# Patient Record
Sex: FEMALE | Race: WHITE | NOT HISPANIC OR LATINO
[De-identification: names, ages, dates, MRNs, and addresses within clinical notes are randomized per-mention and may not be internally consistent; named-entity substitution may affect disease eponyms.]

---

## 2018-01-10 ENCOUNTER — APPOINTMENT (OUTPATIENT)
Dept: CARDIOLOGY | Facility: CLINIC | Age: 83
End: 2018-01-10
Payer: MEDICARE

## 2018-01-10 PROCEDURE — 99214 OFFICE O/P EST MOD 30 MIN: CPT

## 2018-01-10 PROCEDURE — 93000 ELECTROCARDIOGRAM COMPLETE: CPT

## 2018-01-16 ENCOUNTER — APPOINTMENT (OUTPATIENT)
Dept: PULMONOLOGY | Facility: CLINIC | Age: 83
End: 2018-01-16
Payer: MEDICARE

## 2018-01-16 VITALS — BODY MASS INDEX: 30.04 KG/M2 | HEIGHT: 59 IN | WEIGHT: 149 LBS

## 2018-01-16 VITALS — OXYGEN SATURATION: 99 %

## 2018-01-16 DIAGNOSIS — J18.9 PNEUMONIA, UNSPECIFIED ORGANISM: ICD-10-CM

## 2018-01-16 DIAGNOSIS — Z86.79 PERSONAL HISTORY OF OTHER DISEASES OF THE CIRCULATORY SYSTEM: ICD-10-CM

## 2018-01-16 PROCEDURE — 94664 DEMO&/EVAL PT USE INHALER: CPT | Mod: 59

## 2018-01-16 PROCEDURE — 85018 HEMOGLOBIN: CPT | Mod: QW

## 2018-01-16 PROCEDURE — 99205 OFFICE O/P NEW HI 60 MIN: CPT | Mod: 25

## 2018-01-16 PROCEDURE — 94727 GAS DIL/WSHOT DETER LNG VOL: CPT

## 2018-01-16 PROCEDURE — 94060 EVALUATION OF WHEEZING: CPT

## 2018-01-16 PROCEDURE — 94729 DIFFUSING CAPACITY: CPT

## 2018-01-21 DIAGNOSIS — Z87.898 PERSONAL HISTORY OF OTHER SPECIFIED CONDITIONS: ICD-10-CM

## 2018-01-21 DIAGNOSIS — Z92.89 PERSONAL HISTORY OF OTHER MEDICAL TREATMENT: ICD-10-CM

## 2018-02-09 PROBLEM — Z87.898 HISTORY OF EDEMA: Status: RESOLVED | Noted: 2018-02-09 | Resolved: 2018-02-09

## 2018-02-09 PROBLEM — Z92.89 HISTORY OF RECENT HOSPITALIZATION: Status: ACTIVE | Noted: 2018-02-09

## 2018-02-12 ENCOUNTER — FORM ENCOUNTER (OUTPATIENT)
Age: 83
End: 2018-02-12

## 2018-02-13 ENCOUNTER — MEDICATION RENEWAL (OUTPATIENT)
Age: 83
End: 2018-02-13

## 2018-02-13 ENCOUNTER — OUTPATIENT (OUTPATIENT)
Dept: OUTPATIENT SERVICES | Facility: HOSPITAL | Age: 83
LOS: 1 days | End: 2018-02-13
Payer: MEDICARE

## 2018-02-13 DIAGNOSIS — I27.20 PULMONARY HYPERTENSION, UNSPECIFIED: ICD-10-CM

## 2018-02-13 PROCEDURE — 78582 LUNG VENTILAT&PERFUS IMAGING: CPT | Mod: 26

## 2018-02-13 PROCEDURE — 78582 LUNG VENTILAT&PERFUS IMAGING: CPT

## 2018-02-13 PROCEDURE — A9567: CPT

## 2018-02-13 PROCEDURE — 71045 X-RAY EXAM CHEST 1 VIEW: CPT

## 2018-02-13 PROCEDURE — 71045 X-RAY EXAM CHEST 1 VIEW: CPT | Mod: 26

## 2018-02-13 PROCEDURE — A9540: CPT

## 2018-02-20 ENCOUNTER — APPOINTMENT (OUTPATIENT)
Dept: CARDIOLOGY | Facility: CLINIC | Age: 83
End: 2018-02-20
Payer: MEDICARE

## 2018-02-20 ENCOUNTER — RX RENEWAL (OUTPATIENT)
Age: 83
End: 2018-02-20

## 2018-02-20 VITALS
OXYGEN SATURATION: 96 % | HEART RATE: 81 BPM | WEIGHT: 155 LBS | BODY MASS INDEX: 33.44 KG/M2 | SYSTOLIC BLOOD PRESSURE: 104 MMHG | RESPIRATION RATE: 20 BRPM | HEIGHT: 57 IN | DIASTOLIC BLOOD PRESSURE: 60 MMHG

## 2018-02-20 PROCEDURE — 93280 PM DEVICE PROGR EVAL DUAL: CPT

## 2018-02-20 PROCEDURE — 93306 TTE W/DOPPLER COMPLETE: CPT

## 2018-02-20 PROCEDURE — 93000 ELECTROCARDIOGRAM COMPLETE: CPT | Mod: XE

## 2018-02-20 PROCEDURE — 99215 OFFICE O/P EST HI 40 MIN: CPT

## 2018-02-20 RX ORDER — OXYCODONE AND ACETAMINOPHEN 5; 325 MG/1; MG/1
5-325 TABLET ORAL
Refills: 0 | Status: DISCONTINUED | COMMUNITY
End: 2018-02-20

## 2018-02-20 RX ORDER — METOPROLOL SUCCINATE 25 MG/1
25 TABLET, EXTENDED RELEASE ORAL
Refills: 0 | Status: DISCONTINUED | COMMUNITY
End: 2018-02-20

## 2018-03-19 ENCOUNTER — APPOINTMENT (OUTPATIENT)
Dept: CARDIOLOGY | Facility: CLINIC | Age: 83
End: 2018-03-19
Payer: MEDICARE

## 2018-03-19 ENCOUNTER — FORM ENCOUNTER (OUTPATIENT)
Age: 83
End: 2018-03-19

## 2018-03-19 VITALS
HEIGHT: 59 IN | SYSTOLIC BLOOD PRESSURE: 115 MMHG | HEART RATE: 80 BPM | WEIGHT: 153 LBS | RESPIRATION RATE: 14 BRPM | BODY MASS INDEX: 30.84 KG/M2 | DIASTOLIC BLOOD PRESSURE: 70 MMHG

## 2018-03-19 PROCEDURE — 93000 ELECTROCARDIOGRAM COMPLETE: CPT

## 2018-03-19 PROCEDURE — 99214 OFFICE O/P EST MOD 30 MIN: CPT

## 2018-03-20 ENCOUNTER — APPOINTMENT (OUTPATIENT)
Dept: CT IMAGING | Facility: CLINIC | Age: 83
End: 2018-03-20
Payer: MEDICARE

## 2018-03-20 ENCOUNTER — OUTPATIENT (OUTPATIENT)
Dept: OUTPATIENT SERVICES | Facility: HOSPITAL | Age: 83
LOS: 1 days | End: 2018-03-20
Payer: MEDICARE

## 2018-03-20 DIAGNOSIS — G47.33 OBSTRUCTIVE SLEEP APNEA (ADULT) (PEDIATRIC): ICD-10-CM

## 2018-03-20 PROCEDURE — 71250 CT THORAX DX C-: CPT

## 2018-03-20 PROCEDURE — 71250 CT THORAX DX C-: CPT | Mod: 26

## 2018-04-03 ENCOUNTER — APPOINTMENT (OUTPATIENT)
Dept: CARDIOLOGY | Facility: CLINIC | Age: 83
End: 2018-04-03
Payer: MEDICARE

## 2018-04-03 PROCEDURE — 93280 PM DEVICE PROGR EVAL DUAL: CPT

## 2018-04-17 ENCOUNTER — APPOINTMENT (OUTPATIENT)
Dept: PULMONOLOGY | Facility: CLINIC | Age: 83
End: 2018-04-17
Payer: MEDICARE

## 2018-04-17 VITALS
SYSTOLIC BLOOD PRESSURE: 106 MMHG | OXYGEN SATURATION: 96 % | DIASTOLIC BLOOD PRESSURE: 46 MMHG | HEART RATE: 71 BPM | WEIGHT: 153 LBS | BODY MASS INDEX: 30.9 KG/M2

## 2018-04-17 PROCEDURE — 99215 OFFICE O/P EST HI 40 MIN: CPT

## 2018-04-26 ENCOUNTER — MEDICATION RENEWAL (OUTPATIENT)
Age: 83
End: 2018-04-26

## 2018-05-02 ENCOUNTER — MEDICATION RENEWAL (OUTPATIENT)
Age: 83
End: 2018-05-02

## 2018-05-02 ENCOUNTER — RX RENEWAL (OUTPATIENT)
Age: 83
End: 2018-05-02

## 2018-05-21 ENCOUNTER — APPOINTMENT (OUTPATIENT)
Dept: CARDIOLOGY | Facility: CLINIC | Age: 83
End: 2018-05-21
Payer: MEDICARE

## 2018-05-21 PROCEDURE — 93280 PM DEVICE PROGR EVAL DUAL: CPT

## 2018-05-23 ENCOUNTER — MEDICATION RENEWAL (OUTPATIENT)
Age: 83
End: 2018-05-23

## 2018-05-24 ENCOUNTER — APPOINTMENT (OUTPATIENT)
Dept: CARDIOLOGY | Facility: CLINIC | Age: 83
End: 2018-05-24
Payer: MEDICARE

## 2018-05-24 VITALS
SYSTOLIC BLOOD PRESSURE: 95 MMHG | HEIGHT: 59 IN | DIASTOLIC BLOOD PRESSURE: 58 MMHG | HEART RATE: 72 BPM | BODY MASS INDEX: 30.64 KG/M2 | WEIGHT: 152 LBS | RESPIRATION RATE: 14 BRPM

## 2018-05-24 PROCEDURE — 93000 ELECTROCARDIOGRAM COMPLETE: CPT

## 2018-05-24 PROCEDURE — 99215 OFFICE O/P EST HI 40 MIN: CPT

## 2018-05-24 RX ORDER — MULTIVIT-MIN/FOLIC/VIT K/LYCOP 400-300MCG
50 MCG TABLET ORAL
Qty: 90 | Refills: 0 | Status: ACTIVE | COMMUNITY

## 2018-05-24 RX ORDER — FERROUS SULFATE 325(65) MG
TABLET ORAL DAILY
Refills: 0 | Status: ACTIVE | COMMUNITY

## 2018-05-24 RX ORDER — WARFARIN 3 MG/1
3 TABLET ORAL DAILY
Qty: 90 | Refills: 0 | Status: ACTIVE | COMMUNITY
Start: 2017-09-18

## 2018-06-01 ENCOUNTER — CHART COPY (OUTPATIENT)
Age: 83
End: 2018-06-01

## 2018-07-30 ENCOUNTER — APPOINTMENT (OUTPATIENT)
Dept: CARDIOLOGY | Facility: CLINIC | Age: 83
End: 2018-07-30
Payer: MEDICARE

## 2018-07-30 VITALS
DIASTOLIC BLOOD PRESSURE: 58 MMHG | HEIGHT: 59 IN | BODY MASS INDEX: 30.24 KG/M2 | WEIGHT: 150 LBS | RESPIRATION RATE: 18 BRPM | HEART RATE: 70 BPM | SYSTOLIC BLOOD PRESSURE: 100 MMHG

## 2018-07-30 VITALS — WEIGHT: 150 LBS | BODY MASS INDEX: 30.24 KG/M2 | HEIGHT: 59 IN

## 2018-07-30 PROCEDURE — 99214 OFFICE O/P EST MOD 30 MIN: CPT

## 2018-07-30 PROCEDURE — 93000 ELECTROCARDIOGRAM COMPLETE: CPT

## 2018-07-30 RX ORDER — PREGABALIN 100 MG/1
100 CAPSULE ORAL 3 TIMES DAILY
Refills: 0 | Status: DISCONTINUED | COMMUNITY
End: 2018-07-30

## 2018-08-06 RX ORDER — OMEGA-3/DHA/EPA/FISH OIL 300-1000MG
400 CAPSULE ORAL
Refills: 0 | Status: DISCONTINUED | COMMUNITY
End: 2018-08-06

## 2018-08-21 ENCOUNTER — APPOINTMENT (OUTPATIENT)
Dept: CARDIOLOGY | Facility: CLINIC | Age: 83
End: 2018-08-21
Payer: MEDICARE

## 2018-08-21 PROCEDURE — 93280 PM DEVICE PROGR EVAL DUAL: CPT

## 2018-08-28 ENCOUNTER — MEDICATION RENEWAL (OUTPATIENT)
Age: 83
End: 2018-08-28

## 2018-08-31 ENCOUNTER — RX RENEWAL (OUTPATIENT)
Age: 83
End: 2018-08-31

## 2018-09-07 ENCOUNTER — RX RENEWAL (OUTPATIENT)
Age: 83
End: 2018-09-07

## 2018-09-18 ENCOUNTER — APPOINTMENT (OUTPATIENT)
Dept: CARDIOLOGY | Facility: CLINIC | Age: 83
End: 2018-09-18

## 2018-09-26 ENCOUNTER — APPOINTMENT (OUTPATIENT)
Dept: CARDIOLOGY | Facility: CLINIC | Age: 83
End: 2018-09-26
Payer: MEDICARE

## 2018-09-26 PROCEDURE — 93280 PM DEVICE PROGR EVAL DUAL: CPT

## 2018-10-08 RX ORDER — PREGABALIN 100 MG/1
100 CAPSULE ORAL
Refills: 0 | Status: ACTIVE | COMMUNITY

## 2018-10-15 ENCOUNTER — APPOINTMENT (OUTPATIENT)
Dept: PULMONOLOGY | Facility: CLINIC | Age: 83
End: 2018-10-15
Payer: MEDICARE

## 2018-10-15 VITALS
SYSTOLIC BLOOD PRESSURE: 112 MMHG | OXYGEN SATURATION: 97 % | WEIGHT: 150 LBS | DIASTOLIC BLOOD PRESSURE: 48 MMHG | BODY MASS INDEX: 30.3 KG/M2 | HEART RATE: 77 BPM

## 2018-10-15 DIAGNOSIS — Z87.891 PERSONAL HISTORY OF NICOTINE DEPENDENCE: ICD-10-CM

## 2018-10-15 PROCEDURE — 99214 OFFICE O/P EST MOD 30 MIN: CPT

## 2018-10-15 RX ORDER — IPRATROPIUM BROMIDE AND ALBUTEROL SULFATE 2.5; .5 MG/3ML; MG/3ML
0.5-2.5 (3) SOLUTION RESPIRATORY (INHALATION)
Refills: 3 | Status: DISCONTINUED | COMMUNITY
End: 2018-10-15

## 2018-10-15 RX ORDER — FLUTICASONE PROPIONATE AND SALMETEROL 50; 250 UG/1; UG/1
250-50 POWDER RESPIRATORY (INHALATION) TWICE DAILY
Refills: 0 | Status: DISCONTINUED | COMMUNITY
End: 2018-10-15

## 2018-10-15 RX ORDER — OXYCODONE AND ACETAMINOPHEN 5; 325 MG/1; MG/1
5-325 TABLET ORAL
Refills: 0 | Status: DISCONTINUED | COMMUNITY
End: 2018-10-15

## 2018-10-18 ENCOUNTER — APPOINTMENT (OUTPATIENT)
Dept: CARDIOLOGY | Facility: CLINIC | Age: 83
End: 2018-10-18
Payer: MEDICARE

## 2018-10-18 VITALS
HEIGHT: 59 IN | SYSTOLIC BLOOD PRESSURE: 115 MMHG | RESPIRATION RATE: 16 BRPM | HEART RATE: 70 BPM | WEIGHT: 150 LBS | DIASTOLIC BLOOD PRESSURE: 58 MMHG | BODY MASS INDEX: 30.24 KG/M2

## 2018-10-18 PROCEDURE — 93306 TTE W/DOPPLER COMPLETE: CPT

## 2018-10-18 PROCEDURE — 93000 ELECTROCARDIOGRAM COMPLETE: CPT

## 2018-10-18 PROCEDURE — 99214 OFFICE O/P EST MOD 30 MIN: CPT

## 2018-11-01 ENCOUNTER — RX RENEWAL (OUTPATIENT)
Age: 83
End: 2018-11-01

## 2018-11-01 ENCOUNTER — MEDICATION RENEWAL (OUTPATIENT)
Age: 83
End: 2018-11-01

## 2018-11-12 ENCOUNTER — RX RENEWAL (OUTPATIENT)
Age: 83
End: 2018-11-12

## 2019-01-01 ENCOUNTER — RX RENEWAL (OUTPATIENT)
Age: 84
End: 2019-01-01

## 2019-01-01 ENCOUNTER — APPOINTMENT (OUTPATIENT)
Dept: CARDIOLOGY | Facility: CLINIC | Age: 84
End: 2019-01-01
Payer: MEDICARE

## 2019-01-01 PROCEDURE — 93288 INTERROG EVL PM/LDLS PM IP: CPT

## 2019-01-01 PROCEDURE — 93306 TTE W/DOPPLER COMPLETE: CPT

## 2019-01-01 RX ORDER — PERFLUTREN 6.52 MG/ML
6.52 INJECTION, SUSPENSION INTRAVENOUS
Qty: 4 | Refills: 0 | Status: COMPLETED | OUTPATIENT
Start: 2019-01-01

## 2019-01-01 RX ADMIN — PERFLUTREN MG/ML: 6.52 INJECTION, SUSPENSION INTRAVENOUS at 00:00

## 2019-01-09 ENCOUNTER — APPOINTMENT (OUTPATIENT)
Dept: CARDIOLOGY | Facility: CLINIC | Age: 84
End: 2019-01-09
Payer: MEDICARE

## 2019-01-09 VITALS
DIASTOLIC BLOOD PRESSURE: 62 MMHG | BODY MASS INDEX: 30.44 KG/M2 | SYSTOLIC BLOOD PRESSURE: 120 MMHG | WEIGHT: 151 LBS | HEART RATE: 70 BPM | HEIGHT: 59 IN | RESPIRATION RATE: 16 BRPM

## 2019-01-09 PROCEDURE — 93000 ELECTROCARDIOGRAM COMPLETE: CPT

## 2019-01-09 PROCEDURE — 99214 OFFICE O/P EST MOD 30 MIN: CPT

## 2019-01-09 RX ORDER — POTASSIUM CHLORIDE 1500 MG/1
20 TABLET, FILM COATED, EXTENDED RELEASE ORAL
Qty: 90 | Refills: 2 | Status: DISCONTINUED | COMMUNITY
Start: 2018-02-20 | End: 2019-01-09

## 2019-01-09 NOTE — PHYSICAL EXAM
[Normal Conjunctiva] : the conjunctiva exhibited no abnormalities [Eyelids - No Xanthelasma] : the eyelids demonstrated no xanthelasmas [Normal Oral Mucosa] : normal oral mucosa [No Oral Pallor] : no oral pallor [No Oral Cyanosis] : no oral cyanosis [Normal Jugular Venous A Waves Present] : normal jugular venous A waves present [Normal Jugular Venous V Waves Present] : normal jugular venous V waves present [No Jugular Venous Mast A Waves] : no jugular venous mast A waves [Respiration, Rhythm And Depth] : normal respiratory rhythm and effort [Exaggerated Use Of Accessory Muscles For Inspiration] : no accessory muscle use [Auscultation Breath Sounds / Voice Sounds] : lungs were clear to auscultation bilaterally [FreeTextEntry1] : Cardiac:\par Nl S1 S2 with a grade 2/6  SHANEL and no significant  gallups or rubs. [Abdomen Soft] : soft [Abdomen Tenderness] : non-tender [Abdomen Mass (___ Cm)] : no abdominal mass palpated [Nail Clubbing] : no clubbing of the fingernails [Cyanosis, Localized] : no localized cyanosis [Petechial Hemorrhages (___cm)] : no petechial hemorrhages [Skin Color & Pigmentation] : normal skin color and pigmentation [] : no rash [No Venous Stasis] : no venous stasis [Skin Lesions] : no skin lesions [No Skin Ulcers] : no skin ulcer [No Xanthoma] : no  xanthoma was observed [Oriented To Time, Place, And Person] : oriented to person, place, and time [Affect] : the affect was normal [Mood] : the mood was normal [No Anxiety] : not feeling anxious

## 2019-01-09 NOTE — REVIEW OF SYSTEMS
[Loss Of Hearing] : hearing loss [Shortness Of Breath] : shortness of breath [Dyspnea on exertion] : dyspnea during exertion [Chest  Pressure] : no chest pressure [Lower Ext Edema] : lower extremity edema [Joint Pain] : joint pain [Joint Swelling] : joint swelling [see HPI] : see HPI [Tremor] : a tremor was seen [Negative] : Heme/Lymph

## 2019-01-09 NOTE — ASSESSMENT
[FreeTextEntry1] : ECG: Underlying atrial fibrillation with 100% ventricular pacing\par \par Echocardiogram 10/18/18:\par Pulmonary systolic pressure 58.mmHg\par Low-normal RV systolic function\par Mild biatrial enlargement\par Moderate tricuspid insufficiency\par Bioprosthetic mitral valve well seated and functioning normally.\par Mild aortic valve stenosis.\par Comparison with an echocardiogram one year ago shows no significant interval change.\par \par Impression:\par 1. Clinically improved with aggressive diuretics, consistent with HFpEF and PAH.\par 2. Still remains oxygen dependent which suggests a component other than heart failure. Though evaluation by Dr hooper has not demonstrated any other obvious pulmonary process. Recent desaturations are concerning\par 3.Increase in creatinine due to the aggressive diuretic therapy.  High was 1.7, now at 1.09 which  is near her new low\par 4. Appropriate atrioventricular pacing.\par 5. Moderately severe pulmonary artery hypertension unchanged from the last echocardiogram despite Trachleer\par \par Plan:\par 1. Will continue diuretic regimen metolazone 2.5 daily and lasix 40 QD\par \par 2. Will assess clinically and check weights, and resting pulse-oximetry  weekly to see what role the volume status may be playing.\par \par 3. Repeat lab data monthly.\par \par 3. Echo every 6 months\par \par 4 Asked her to consider evaluation by a HF and PAH specialist, which she'll think about and meanwhile will continue the treatment for her pulmonary hypertension. \par \par 5. PPM check per schedule

## 2019-01-09 NOTE — REASON FOR VISIT
[FreeTextEntry1] : Patient presents for re re assessment with regard to management of\par 1. Hypoxemia - apparently desaturating periodically. Usually with exertion , but sometimes at rest and assoc with slight change in sensorium\par 2. Volume overload - largely controlled with the current diuretic regimen\par 3. Pulmonary hypertension - severe and of seemingly mixed but uncertain etiology ( see earlier notes)\par \par Has been stable on the current doses of diuretic therapy.\par He is requiring oxygen at 2-3 L nasal cannula on a 24 7 basis.\par Denies any increased orthopnea, PND, edema,.\par No other new or intercurrent medical problems.

## 2019-01-09 NOTE — HISTORY OF PRESENT ILLNESS
[FreeTextEntry1] : \par Patient returns here for followup with regard to management of problems which include:\par \par 1. History of respiratory difficulty and hypoxemia\par \par 2. History of a bioprosthetic mitral valve replacement\par \par 3. History of severe pulmonary artery hypertension\par \par 4. History of COPD\par \par 5. History of a permanent pacemaker\par

## 2019-01-15 ENCOUNTER — RX RENEWAL (OUTPATIENT)
Age: 84
End: 2019-01-15

## 2019-01-15 RX ORDER — POTASSIUM CHLORIDE 1500 MG/1
20 TABLET, EXTENDED RELEASE ORAL
Qty: 90 | Refills: 0 | Status: DISCONTINUED | COMMUNITY
Start: 2018-08-28 | End: 2019-01-15

## 2019-01-15 RX ORDER — POTASSIUM CHLORIDE 600 MG/1
8 CAPSULE, EXTENDED RELEASE ORAL
Qty: 180 | Refills: 3 | Status: DISCONTINUED | COMMUNITY
Start: 2019-01-09 | End: 2019-01-15

## 2019-01-28 ENCOUNTER — RX RENEWAL (OUTPATIENT)
Age: 84
End: 2019-01-28

## 2019-02-05 ENCOUNTER — INBOUND DOCUMENT (OUTPATIENT)
Age: 84
End: 2019-02-05

## 2019-04-02 ENCOUNTER — CLINICAL ADVICE (OUTPATIENT)
Age: 84
End: 2019-04-02

## 2019-04-09 ENCOUNTER — APPOINTMENT (OUTPATIENT)
Dept: CARDIOLOGY | Facility: CLINIC | Age: 84
End: 2019-04-09
Payer: MEDICARE

## 2019-04-09 PROCEDURE — 93288 INTERROG EVL PM/LDLS PM IP: CPT

## 2019-04-16 ENCOUNTER — APPOINTMENT (OUTPATIENT)
Dept: PULMONOLOGY | Facility: CLINIC | Age: 84
End: 2019-04-16

## 2019-05-13 ENCOUNTER — APPOINTMENT (OUTPATIENT)
Dept: CARDIOLOGY | Facility: CLINIC | Age: 84
End: 2019-05-13
Payer: MEDICARE

## 2019-05-13 ENCOUNTER — RX CHANGE (OUTPATIENT)
Age: 84
End: 2019-05-13

## 2019-05-13 ENCOUNTER — RX RENEWAL (OUTPATIENT)
Age: 84
End: 2019-05-13

## 2019-05-13 ENCOUNTER — NON-APPOINTMENT (OUTPATIENT)
Age: 84
End: 2019-05-13

## 2019-05-13 VITALS
WEIGHT: 150 LBS | DIASTOLIC BLOOD PRESSURE: 65 MMHG | BODY MASS INDEX: 30.24 KG/M2 | RESPIRATION RATE: 16 BRPM | SYSTOLIC BLOOD PRESSURE: 145 MMHG | HEART RATE: 71 BPM | HEIGHT: 59 IN

## 2019-05-13 PROCEDURE — 99214 OFFICE O/P EST MOD 30 MIN: CPT

## 2019-05-13 PROCEDURE — 93000 ELECTROCARDIOGRAM COMPLETE: CPT

## 2019-05-13 RX ORDER — POTASSIUM CHLORIDE 1.5 G/1.58G
20 POWDER, FOR SOLUTION ORAL DAILY
Qty: 90 | Refills: 1 | Status: DISCONTINUED | COMMUNITY
Start: 2019-01-15 | End: 2019-05-13

## 2019-05-13 RX ORDER — METOPROLOL TARTRATE 25 MG/1
25 TABLET, FILM COATED ORAL TWICE DAILY
Qty: 180 | Refills: 3 | Status: ACTIVE | COMMUNITY
Start: 2017-09-28 | End: 1900-01-01

## 2019-05-13 NOTE — REVIEW OF SYSTEMS
[Loss Of Hearing] : hearing loss [Shortness Of Breath] : shortness of breath [Dyspnea on exertion] : dyspnea during exertion [Chest  Pressure] : no chest pressure [Lower Ext Edema] : lower extremity edema [Joint Pain] : joint pain [see HPI] : see HPI [Joint Swelling] : joint swelling [Tremor] : a tremor was seen [Negative] : Heme/Lymph

## 2019-05-13 NOTE — ASSESSMENT
[FreeTextEntry1] : ECG: Underlying atrial fibrillation with 100% ventricular pacing\par \par \par Laboratory data 3/28/19:\par BUN 52\par Creatinine 1.65\par Potassium 4.3\par LFTs are normal.\par \par Pacemaker interrogation 4/9/19:\par Battery with 2.5 years\par 100% paced\par Thresholds all OK\par Several runs of nonsustained ventricular tachycardia at rapid rates.\par \par \par Echocardiogram 10/18/18:\par Pulmonary systolic pressure 58.mmHg\par Low-normal RV systolic function\par Mild biatrial enlargement\par Moderate tricuspid insufficiency\par Bioprosthetic mitral valve well seated and functioning normally.\par Mild aortic valve stenosis.\par Comparison with an echocardiogram one year ago shows no significant interval change.\par \par Impression:\par 1. Clinically improved with aggressive diuretics, consistent with HFpEF and PAH.\par 2. Still remains oxygen dependent which suggests a component other than heart failure. Though evaluation by Dr hooper has not demonstrated any other obvious pulmonary process. Recent desaturations are concerning\par 3.Increase in creatinine due to the aggressive diuretic therapy.  High was 1.7, now at 1.09 which  is near her new low\par 4. Appropriate atrioventricular pacing.\par 5. Moderately severe pulmonary artery hypertension unchanged from the last echocardiogram despite Trachleer\par \par Plan:\par 1. Will continue diuretic regimen metolazone 2.5 daily and lasix 40 QD\par \par 2. Will try to get records of the pulmonary artery hypertension evaluation was performed.\par \par 3. Repeat lab data monthly.\par \par 3. Echo every 6 months\par \par 4 No intervention for the nonsustained ventricular tachycardia at this point in time. Patient is asymptomatic and risk benefit ratio would not seem to favor intervention.\par \par 5. PPM check per schedule

## 2019-05-13 NOTE — REASON FOR VISIT
[FreeTextEntry1] : Patient presents for re re assessment with regard to management of\par 1. Hypoxemia - apparently desaturating periodically. Usually with exertion , but sometimes at rest and assoc with slight change in sensorium\par 2. Volume overload - largely controlled with the current diuretic regimen\par 3. Pulmonary hypertension - severe and of seemingly mixed but uncertain etiology ( see earlier notes)\par \par Got an opinion from a PAH Doc, but we've received no communication and they don't recall name\par \par Having difficulty with potassium replacement options. Was unable to tolerate a larger potassium tablets.\par Packets of KCl resulted in diarrhea.\par Micro-K has not been available to her pharmacy.\par \par Has been stable on the current doses of diuretic therapy.\par She is requiring oxygen at 2-3 L nasal cannula on a 24 7 basis.\par Denies any increased orthopnea, PND, edema,.\par No other new or intercurrent medical problems.

## 2019-05-13 NOTE — PHYSICAL EXAM
[Normal Conjunctiva] : the conjunctiva exhibited no abnormalities [Eyelids - No Xanthelasma] : the eyelids demonstrated no xanthelasmas [Normal Oral Mucosa] : normal oral mucosa [No Oral Pallor] : no oral pallor [No Oral Cyanosis] : no oral cyanosis [Normal Jugular Venous A Waves Present] : normal jugular venous A waves present [Normal Jugular Venous V Waves Present] : normal jugular venous V waves present [No Jugular Venous Mast A Waves] : no jugular venous mast A waves [Respiration, Rhythm And Depth] : normal respiratory rhythm and effort [Auscultation Breath Sounds / Voice Sounds] : lungs were clear to auscultation bilaterally [Exaggerated Use Of Accessory Muscles For Inspiration] : no accessory muscle use [FreeTextEntry1] : Cardiac:\par Nl S1 S2 with a grade 2/6  SHANEL and no significant  gallops or rubs. [Abdomen Tenderness] : non-tender [Abdomen Soft] : soft [Abdomen Mass (___ Cm)] : no abdominal mass palpated [Cyanosis, Localized] : no localized cyanosis [Nail Clubbing] : no clubbing of the fingernails [Petechial Hemorrhages (___cm)] : no petechial hemorrhages [Skin Color & Pigmentation] : normal skin color and pigmentation [] : no rash [No Venous Stasis] : no venous stasis [Skin Lesions] : no skin lesions [No Xanthoma] : no  xanthoma was observed [No Skin Ulcers] : no skin ulcer [Oriented To Time, Place, And Person] : oriented to person, place, and time [Affect] : the affect was normal [Mood] : the mood was normal [No Anxiety] : not feeling anxious

## 2019-05-14 ENCOUNTER — RX RENEWAL (OUTPATIENT)
Age: 84
End: 2019-05-14

## 2019-05-16 ENCOUNTER — RX RENEWAL (OUTPATIENT)
Age: 84
End: 2019-05-16

## 2019-05-16 ENCOUNTER — OTHER (OUTPATIENT)
Age: 84
End: 2019-05-16

## 2019-06-17 ENCOUNTER — RX RENEWAL (OUTPATIENT)
Age: 84
End: 2019-06-17

## 2019-06-26 ENCOUNTER — CLINICAL ADVICE (OUTPATIENT)
Age: 84
End: 2019-06-26

## 2019-06-27 ENCOUNTER — MEDICATION RENEWAL (OUTPATIENT)
Age: 84
End: 2019-06-27

## 2019-08-20 ENCOUNTER — RX RENEWAL (OUTPATIENT)
Age: 84
End: 2019-08-20

## 2019-09-23 ENCOUNTER — NON-APPOINTMENT (OUTPATIENT)
Age: 84
End: 2019-09-23

## 2019-09-23 ENCOUNTER — APPOINTMENT (OUTPATIENT)
Dept: CARDIOLOGY | Facility: CLINIC | Age: 84
End: 2019-09-23
Payer: MEDICARE

## 2019-09-23 VITALS
SYSTOLIC BLOOD PRESSURE: 119 MMHG | HEIGHT: 59 IN | OXYGEN SATURATION: 94 % | WEIGHT: 146 LBS | BODY MASS INDEX: 29.43 KG/M2 | HEART RATE: 71 BPM | RESPIRATION RATE: 14 BRPM | DIASTOLIC BLOOD PRESSURE: 65 MMHG

## 2019-09-23 DIAGNOSIS — Z00.00 ENCOUNTER FOR GENERAL ADULT MEDICAL EXAMINATION W/OUT ABNORMAL FINDINGS: ICD-10-CM

## 2019-09-23 PROCEDURE — 99214 OFFICE O/P EST MOD 30 MIN: CPT

## 2019-09-23 PROCEDURE — 93000 ELECTROCARDIOGRAM COMPLETE: CPT

## 2019-09-23 RX ORDER — NYSTATIN 100000 1/G
100000 POWDER TOPICAL
Qty: 30 | Refills: 0 | Status: DISCONTINUED | COMMUNITY
Start: 2019-01-18 | End: 2019-09-23

## 2019-09-23 NOTE — PHYSICAL EXAM
[Normal Conjunctiva] : the conjunctiva exhibited no abnormalities [Eyelids - No Xanthelasma] : the eyelids demonstrated no xanthelasmas [Normal Oral Mucosa] : normal oral mucosa [No Oral Pallor] : no oral pallor [No Oral Cyanosis] : no oral cyanosis [Normal Jugular Venous A Waves Present] : normal jugular venous A waves present [Normal Jugular Venous V Waves Present] : normal jugular venous V waves present [No Jugular Venous Mast A Waves] : no jugular venous mast A waves [Respiration, Rhythm And Depth] : normal respiratory rhythm and effort [Exaggerated Use Of Accessory Muscles For Inspiration] : no accessory muscle use [Auscultation Breath Sounds / Voice Sounds] : lungs were clear to auscultation bilaterally [Abdomen Soft] : soft [Abdomen Tenderness] : non-tender [Abdomen Mass (___ Cm)] : no abdominal mass palpated [Cyanosis, Localized] : no localized cyanosis [Nail Clubbing] : no clubbing of the fingernails [Petechial Hemorrhages (___cm)] : no petechial hemorrhages [Skin Color & Pigmentation] : normal skin color and pigmentation [] : no rash [No Venous Stasis] : no venous stasis [Skin Lesions] : no skin lesions [No Skin Ulcers] : no skin ulcer [No Xanthoma] : no  xanthoma was observed [Oriented To Time, Place, And Person] : oriented to person, place, and time [Affect] : the affect was normal [Mood] : the mood was normal [No Anxiety] : not feeling anxious [FreeTextEntry1] : Cardiac:\par Nl S1 S2 with a grade 2/6  SHANEL and no significant  gallops or rubs. No edema

## 2019-09-23 NOTE — ASSESSMENT
[FreeTextEntry1] : ECG: Underlying atrial fibrillation with 100% ventricular pacing\par \par \par Laboratory data               3/28/19:         9/20/19\par BUN                                        52                   55\par Creatinine                             1.65               1.59\par Potassium                               4.3                4.3\par LFTs are normal.\par \par Pacemaker interrogation 4/9/19:\par Battery with 2.5 years\par 100% paced\par Thresholds all OK\par Several runs of nonsustained ventricular tachycardia at rapid rates.\par \par \par Echocardiogram 10/18/18:\par Pulmonary systolic pressure 58.mmHg\par Low-normal RV systolic function\par Mild biatrial enlargement\par Moderate tricuspid insufficiency\par Bioprosthetic mitral valve well seated and functioning normally.\par Mild aortic valve stenosis.\par Comparison with an echocardiogram one year ago shows no significant interval change.\par \par Impression:\par 1. Clinically improved with aggressive diuretics, consistent with HFpEF and PAH.\par 2. Still remains oxygen dependent which suggests a component other than heart failure. Though evaluation by Dr hooper has not demonstrated any other obvious pulmonary process. Recent desaturations perhaps assoc with periodic changes in her sensorium\par 3. Creat. now 1.59 and stable, Lasix now 5 x weekly.\par 4. Appropriate ventricular pacing.\par 5. Moderately severe pulmonary artery hypertension unchanged from the last echocardiogram despite Trachleer\par 6. Concerns of increased fatigue  which we an attribute to fractured night time sleep from going to the     bathroom  due to diuretic use. Down 4 lbs since May, no edema on exam.\par \par Plan:\par 1. Will continue diuretic regimen metolazone 2.5 daily and lasix 40 5x weekly.\par \par 2. No changes in medication management at this time.\par \par 3. Repeat lab data monthly.\par \par 4. Echo to be completed next month felicia re PAH and PPM interrogation. \par \par Clinical follow up in 4 months

## 2019-09-23 NOTE — REVIEW OF SYSTEMS
[Loss Of Hearing] : hearing loss [Shortness Of Breath] : shortness of breath [Dyspnea on exertion] : dyspnea during exertion [Lower Ext Edema] : lower extremity edema [Joint Pain] : joint pain [Joint Swelling] : joint swelling [see HPI] : see HPI [Tremor] : a tremor was seen [Negative] : Heme/Lymph [Chest  Pressure] : no chest pressure

## 2019-09-23 NOTE — REASON FOR VISIT
[FreeTextEntry1] : Patient presents for re re assessment with regard to management of\par 1. Hypoxemia - apparently desaturating periodically. Usually with exertion , but sometimes at rest and assoc with slight change in sensorium\par 2. Volume overload - largely controlled with the current diuretic regimen\par 3. Pulmonary hypertension - severe and of seemingly mixed but uncertain etiology ( see earlier notes)\par \par Got an opinion from a PAH Doc, but we've received no communication and they don't recall name\par \par Having difficulty with potassium replacement options. Was unable to tolerate a larger potassium tablets.\par Packets of KCl resulted in diarrhea.\par Micro-K has not been available to her pharmacy.\par Now tolerating Potassium sprinkles \par \par 8/8/19- daughter called with concerns of a 3 lb amada gain since changing Lasix to  QOD, this has been changed to 5 x weekly. Most recent CMP 9/20/19 K =4.3 Creat. 1.59 which is acceptable. \par \par Daughter also voices concerns of increased fatigue through out the day and and has noticed a decrease in her appetite.\par \par \par She is requiring oxygen at 2 L nasal cannula on a 24 7 basis, Spo2 avg 93 %, states breathing is "better"\par Denies any increased orthopnea, PND, edema,.\par No other new or intercurrent medical problems.

## 2019-10-07 ENCOUNTER — RX RENEWAL (OUTPATIENT)
Age: 84
End: 2019-10-07

## 2019-10-08 ENCOUNTER — RX RENEWAL (OUTPATIENT)
Age: 84
End: 2019-10-08

## 2019-10-21 ENCOUNTER — APPOINTMENT (OUTPATIENT)
Dept: CARDIOLOGY | Facility: CLINIC | Age: 84
End: 2019-10-21

## 2020-01-01 ENCOUNTER — APPOINTMENT (OUTPATIENT)
Dept: CARDIOLOGY | Facility: CLINIC | Age: 85
End: 2020-01-01
Payer: MEDICARE

## 2020-01-01 ENCOUNTER — APPOINTMENT (OUTPATIENT)
Dept: CARE COORDINATION | Facility: HOME HEALTH | Age: 85
End: 2020-01-01

## 2020-01-01 ENCOUNTER — RX RENEWAL (OUTPATIENT)
Age: 85
End: 2020-01-01

## 2020-01-01 ENCOUNTER — TRANSCRIPTION ENCOUNTER (OUTPATIENT)
Age: 85
End: 2020-01-01

## 2020-01-01 ENCOUNTER — NON-APPOINTMENT (OUTPATIENT)
Age: 85
End: 2020-01-01

## 2020-01-01 ENCOUNTER — APPOINTMENT (OUTPATIENT)
Dept: CARDIOLOGY | Facility: CLINIC | Age: 85
End: 2020-01-01

## 2020-01-01 ENCOUNTER — INPATIENT (INPATIENT)
Facility: HOSPITAL | Age: 85
LOS: 8 days | Discharge: HOME | DRG: 291 | End: 2020-02-20
Attending: INTERNAL MEDICINE | Admitting: HOSPITALIST
Payer: MEDICARE

## 2020-01-01 VITALS
TEMPERATURE: 97 F | RESPIRATION RATE: 16 BRPM | HEIGHT: 57 IN | WEIGHT: 138.01 LBS | OXYGEN SATURATION: 99 % | SYSTOLIC BLOOD PRESSURE: 116 MMHG | HEART RATE: 70 BPM | DIASTOLIC BLOOD PRESSURE: 71 MMHG

## 2020-01-01 VITALS
RESPIRATION RATE: 14 BRPM | HEIGHT: 59 IN | WEIGHT: 141 LBS | BODY MASS INDEX: 28.43 KG/M2 | DIASTOLIC BLOOD PRESSURE: 60 MMHG | SYSTOLIC BLOOD PRESSURE: 115 MMHG | HEART RATE: 71 BPM | OXYGEN SATURATION: 92 %

## 2020-01-01 VITALS
SYSTOLIC BLOOD PRESSURE: 110 MMHG | DIASTOLIC BLOOD PRESSURE: 62 MMHG | TEMPERATURE: 99 F | HEART RATE: 69 BPM | OXYGEN SATURATION: 97 % | RESPIRATION RATE: 18 BRPM

## 2020-01-01 DIAGNOSIS — E78.5 HYPERLIPIDEMIA, UNSPECIFIED: ICD-10-CM

## 2020-01-01 DIAGNOSIS — K59.00 CONSTIPATION, UNSPECIFIED: ICD-10-CM

## 2020-01-01 DIAGNOSIS — I34.1 NONRHEUMATIC MITRAL (VALVE) PROLAPSE: ICD-10-CM

## 2020-01-01 DIAGNOSIS — A41.9 SEPSIS, UNSPECIFIED ORGANISM: ICD-10-CM

## 2020-01-01 DIAGNOSIS — I50.33 ACUTE ON CHRONIC DIASTOLIC (CONGESTIVE) HEART FAILURE: ICD-10-CM

## 2020-01-01 DIAGNOSIS — J18.9 PNEUMONIA, UNSPECIFIED ORGANISM: ICD-10-CM

## 2020-01-01 DIAGNOSIS — Z71.89 OTHER SPECIFIED COUNSELING: ICD-10-CM

## 2020-01-01 DIAGNOSIS — Z95.0 PRESENCE OF CARDIAC PACEMAKER: Chronic | ICD-10-CM

## 2020-01-01 DIAGNOSIS — I27.20 PULMONARY HYPERTENSION, UNSPECIFIED: ICD-10-CM

## 2020-01-01 DIAGNOSIS — I48.91 UNSPECIFIED ATRIAL FIBRILLATION: ICD-10-CM

## 2020-01-01 DIAGNOSIS — I35.0 NONRHEUMATIC AORTIC (VALVE) STENOSIS: ICD-10-CM

## 2020-01-01 DIAGNOSIS — R53.2 FUNCTIONAL QUADRIPLEGIA: ICD-10-CM

## 2020-01-01 DIAGNOSIS — G47.33 OBSTRUCTIVE SLEEP APNEA (ADULT) (PEDIATRIC): ICD-10-CM

## 2020-01-01 DIAGNOSIS — S92.309A FRACTURE OF UNSPECIFIED METATARSAL BONE(S), UNSPECIFIED FOOT, INITIAL ENCOUNTER FOR CLOSED FRACTURE: ICD-10-CM

## 2020-01-01 DIAGNOSIS — Z51.5 ENCOUNTER FOR PALLIATIVE CARE: ICD-10-CM

## 2020-01-01 DIAGNOSIS — J96.11 CHRONIC RESPIRATORY FAILURE WITH HYPOXIA: ICD-10-CM

## 2020-01-01 DIAGNOSIS — J44.9 CHRONIC OBSTRUCTIVE PULMONARY DISEASE, UNSPECIFIED: ICD-10-CM

## 2020-01-01 DIAGNOSIS — Z95.2 PRESENCE OF PROSTHETIC HEART VALVE: Chronic | ICD-10-CM

## 2020-01-01 LAB
ALBUMIN SERPL ELPH-MCNC: 2.9 G/DL — LOW (ref 3.3–5.2)
ALBUMIN SERPL ELPH-MCNC: 3 G/DL — LOW (ref 3.3–5.2)
ALBUMIN SERPL ELPH-MCNC: 3.4 G/DL — SIGNIFICANT CHANGE UP (ref 3.3–5.2)
ALBUMIN SERPL ELPH-MCNC: 4 G/DL — SIGNIFICANT CHANGE UP (ref 3.3–5.2)
ALP SERPL-CCNC: 38 U/L — LOW (ref 40–120)
ALP SERPL-CCNC: 42 U/L — SIGNIFICANT CHANGE UP (ref 40–120)
ALP SERPL-CCNC: 42 U/L — SIGNIFICANT CHANGE UP (ref 40–120)
ALP SERPL-CCNC: 51 U/L — SIGNIFICANT CHANGE UP (ref 40–120)
ALT FLD-CCNC: 6 U/L — SIGNIFICANT CHANGE UP
ALT FLD-CCNC: 7 U/L — SIGNIFICANT CHANGE UP
ALT FLD-CCNC: 7 U/L — SIGNIFICANT CHANGE UP
ALT FLD-CCNC: <5 U/L — SIGNIFICANT CHANGE UP
ANION GAP SERPL CALC-SCNC: 12 MMOL/L — SIGNIFICANT CHANGE UP (ref 5–17)
ANION GAP SERPL CALC-SCNC: 13 MMOL/L — SIGNIFICANT CHANGE UP (ref 5–17)
ANION GAP SERPL CALC-SCNC: 14 MMOL/L — SIGNIFICANT CHANGE UP (ref 5–17)
ANION GAP SERPL CALC-SCNC: 15 MMOL/L — SIGNIFICANT CHANGE UP (ref 5–17)
ANION GAP SERPL CALC-SCNC: 8 MMOL/L — SIGNIFICANT CHANGE UP (ref 5–17)
ANION GAP SERPL CALC-SCNC: 9 MMOL/L — SIGNIFICANT CHANGE UP (ref 5–17)
APPEARANCE UR: CLEAR — SIGNIFICANT CHANGE UP
APTT BLD: 48.9 SEC — HIGH (ref 27.5–36.3)
APTT BLD: 50.6 SEC — HIGH (ref 27.5–36.3)
AST SERPL-CCNC: 11 U/L — SIGNIFICANT CHANGE UP
AST SERPL-CCNC: 12 U/L — SIGNIFICANT CHANGE UP
AST SERPL-CCNC: 15 U/L — SIGNIFICANT CHANGE UP
AST SERPL-CCNC: 18 U/L — SIGNIFICANT CHANGE UP
BACTERIA # UR AUTO: ABNORMAL
BASE EXCESS BLDA CALC-SCNC: 10.2 MMOL/L — HIGH (ref -2–2)
BASOPHILS # BLD AUTO: 0.01 K/UL — SIGNIFICANT CHANGE UP (ref 0–0.2)
BASOPHILS NFR BLD AUTO: 0.1 % — SIGNIFICANT CHANGE UP (ref 0–2)
BASOPHILS NFR BLD AUTO: 0.1 % — SIGNIFICANT CHANGE UP (ref 0–2)
BASOPHILS NFR BLD AUTO: 0.2 % — SIGNIFICANT CHANGE UP (ref 0–2)
BILIRUB SERPL-MCNC: 0.3 MG/DL — LOW (ref 0.4–2)
BILIRUB SERPL-MCNC: 0.3 MG/DL — LOW (ref 0.4–2)
BILIRUB SERPL-MCNC: 0.4 MG/DL — SIGNIFICANT CHANGE UP (ref 0.4–2)
BILIRUB SERPL-MCNC: 0.4 MG/DL — SIGNIFICANT CHANGE UP (ref 0.4–2)
BILIRUB UR-MCNC: NEGATIVE — SIGNIFICANT CHANGE UP
BLOOD GAS COMMENTS ARTERIAL: SIGNIFICANT CHANGE UP
BUN SERPL-MCNC: 49 MG/DL — HIGH (ref 8–20)
BUN SERPL-MCNC: 53 MG/DL — HIGH (ref 8–20)
BUN SERPL-MCNC: 55 MG/DL — HIGH (ref 8–20)
BUN SERPL-MCNC: 55 MG/DL — HIGH (ref 8–20)
BUN SERPL-MCNC: 57 MG/DL — HIGH (ref 8–20)
BUN SERPL-MCNC: 58 MG/DL — HIGH (ref 8–20)
BUN SERPL-MCNC: 60 MG/DL — HIGH (ref 8–20)
BUN SERPL-MCNC: 64 MG/DL — HIGH (ref 8–20)
CALCIUM SERPL-MCNC: 8.3 MG/DL — LOW (ref 8.6–10.2)
CALCIUM SERPL-MCNC: 8.6 MG/DL — SIGNIFICANT CHANGE UP (ref 8.6–10.2)
CALCIUM SERPL-MCNC: 8.6 MG/DL — SIGNIFICANT CHANGE UP (ref 8.6–10.2)
CALCIUM SERPL-MCNC: 8.7 MG/DL — SIGNIFICANT CHANGE UP (ref 8.6–10.2)
CALCIUM SERPL-MCNC: 9 MG/DL — SIGNIFICANT CHANGE UP (ref 8.6–10.2)
CHLORIDE SERPL-SCNC: 90 MMOL/L — LOW (ref 98–107)
CHLORIDE SERPL-SCNC: 91 MMOL/L — LOW (ref 98–107)
CHLORIDE SERPL-SCNC: 92 MMOL/L — LOW (ref 98–107)
CHLORIDE SERPL-SCNC: 93 MMOL/L — LOW (ref 98–107)
CO2 SERPL-SCNC: 29 MMOL/L — SIGNIFICANT CHANGE UP (ref 22–29)
CO2 SERPL-SCNC: 31 MMOL/L — HIGH (ref 22–29)
CO2 SERPL-SCNC: 31 MMOL/L — HIGH (ref 22–29)
CO2 SERPL-SCNC: 32 MMOL/L — HIGH (ref 22–29)
CO2 SERPL-SCNC: 32 MMOL/L — HIGH (ref 22–29)
CO2 SERPL-SCNC: 34 MMOL/L — HIGH (ref 22–29)
CO2 SERPL-SCNC: 34 MMOL/L — HIGH (ref 22–29)
CO2 SERPL-SCNC: 35 MMOL/L — HIGH (ref 22–29)
COLOR SPEC: YELLOW — SIGNIFICANT CHANGE UP
COMMENT - URINE: SIGNIFICANT CHANGE UP
CREAT SERPL-MCNC: 1.44 MG/DL — HIGH (ref 0.5–1.3)
CREAT SERPL-MCNC: 1.46 MG/DL — HIGH (ref 0.5–1.3)
CREAT SERPL-MCNC: 1.52 MG/DL — HIGH (ref 0.5–1.3)
CREAT SERPL-MCNC: 1.62 MG/DL — HIGH (ref 0.5–1.3)
CREAT SERPL-MCNC: 1.69 MG/DL — HIGH (ref 0.5–1.3)
CREAT SERPL-MCNC: 1.72 MG/DL — HIGH (ref 0.5–1.3)
CREAT SERPL-MCNC: 1.83 MG/DL — HIGH (ref 0.5–1.3)
CREAT SERPL-MCNC: 1.84 MG/DL — HIGH (ref 0.5–1.3)
CULTURE RESULTS: SIGNIFICANT CHANGE UP
DIFF PNL FLD: NEGATIVE — SIGNIFICANT CHANGE UP
EOSINOPHIL # BLD AUTO: 0.02 K/UL — SIGNIFICANT CHANGE UP (ref 0–0.5)
EOSINOPHIL # BLD AUTO: 0.1 K/UL — SIGNIFICANT CHANGE UP (ref 0–0.5)
EOSINOPHIL # BLD AUTO: 0.3 K/UL — SIGNIFICANT CHANGE UP (ref 0–0.5)
EOSINOPHIL NFR BLD AUTO: 0.3 % — SIGNIFICANT CHANGE UP (ref 0–6)
EOSINOPHIL NFR BLD AUTO: 1.2 % — SIGNIFICANT CHANGE UP (ref 0–6)
EOSINOPHIL NFR BLD AUTO: 6 % — SIGNIFICANT CHANGE UP (ref 0–6)
EPI CELLS # UR: SIGNIFICANT CHANGE UP
FERRITIN SERPL-MCNC: 515 NG/ML — HIGH (ref 15–150)
GAS PNL BLDA: SIGNIFICANT CHANGE UP
GAS PNL BLDA: SIGNIFICANT CHANGE UP
GLUCOSE BLDC GLUCOMTR-MCNC: 174 MG/DL — HIGH (ref 70–99)
GLUCOSE SERPL-MCNC: 112 MG/DL — HIGH (ref 70–99)
GLUCOSE SERPL-MCNC: 118 MG/DL — HIGH (ref 70–99)
GLUCOSE SERPL-MCNC: 119 MG/DL — HIGH (ref 70–99)
GLUCOSE SERPL-MCNC: 121 MG/DL — HIGH (ref 70–99)
GLUCOSE SERPL-MCNC: 126 MG/DL — HIGH (ref 70–99)
GLUCOSE SERPL-MCNC: 133 MG/DL — HIGH (ref 70–99)
GLUCOSE SERPL-MCNC: 84 MG/DL — SIGNIFICANT CHANGE UP (ref 70–99)
GLUCOSE SERPL-MCNC: 98 MG/DL — SIGNIFICANT CHANGE UP (ref 70–99)
GLUCOSE UR QL: NEGATIVE — SIGNIFICANT CHANGE UP
HCO3 BLDA-SCNC: 34 MMOL/L — HIGH (ref 20–26)
HCT VFR BLD CALC: 22.7 % — LOW (ref 34.5–45)
HCT VFR BLD CALC: 23.7 % — LOW (ref 34.5–45)
HCT VFR BLD CALC: 24.5 % — LOW (ref 34.5–45)
HCT VFR BLD CALC: 25.2 % — LOW (ref 34.5–45)
HCT VFR BLD CALC: 26.5 % — LOW (ref 34.5–45)
HCT VFR BLD CALC: 26.6 % — LOW (ref 34.5–45)
HCT VFR BLD CALC: 30.5 % — LOW (ref 34.5–45)
HGB BLD-MCNC: 7 G/DL — CRITICAL LOW (ref 11.5–15.5)
HGB BLD-MCNC: 7.2 G/DL — LOW (ref 11.5–15.5)
HGB BLD-MCNC: 7.3 G/DL — LOW (ref 11.5–15.5)
HGB BLD-MCNC: 7.6 G/DL — LOW (ref 11.5–15.5)
HGB BLD-MCNC: 8.3 G/DL — LOW (ref 11.5–15.5)
HGB BLD-MCNC: 8.3 G/DL — LOW (ref 11.5–15.5)
HGB BLD-MCNC: 9.1 G/DL — LOW (ref 11.5–15.5)
HOROWITZ INDEX BLDA+IHG-RTO: 50 — SIGNIFICANT CHANGE UP
IMM GRANULOCYTES NFR BLD AUTO: 0.4 % — SIGNIFICANT CHANGE UP (ref 0–1.5)
IMM GRANULOCYTES NFR BLD AUTO: 0.5 % — SIGNIFICANT CHANGE UP (ref 0–1.5)
IMM GRANULOCYTES NFR BLD AUTO: 0.5 % — SIGNIFICANT CHANGE UP (ref 0–1.5)
INR BLD: 1.12 RATIO — SIGNIFICANT CHANGE UP (ref 0.88–1.16)
INR BLD: 1.15 RATIO — SIGNIFICANT CHANGE UP (ref 0.88–1.16)
INR BLD: 1.23 RATIO — HIGH (ref 0.88–1.16)
INR BLD: 1.35 RATIO — HIGH (ref 0.88–1.16)
INR BLD: 2.02 RATIO — HIGH (ref 0.88–1.16)
INR BLD: 2.11 RATIO — HIGH (ref 0.88–1.16)
INR BLD: 2.12 RATIO — HIGH (ref 0.88–1.16)
INR BLD: 2.19 RATIO — HIGH (ref 0.88–1.16)
INR BLD: 2.66 RATIO — HIGH (ref 0.88–1.16)
INR BLD: 4.14 RATIO — HIGH (ref 0.88–1.16)
INR BLD: 8.16 RATIO — CRITICAL HIGH (ref 0.88–1.16)
IRON SATN MFR SERPL: 13 % — LOW (ref 14–50)
IRON SATN MFR SERPL: 28 UG/DL — LOW (ref 37–145)
KETONES UR-MCNC: NEGATIVE — SIGNIFICANT CHANGE UP
LACTATE BLDV-MCNC: 0.4 MMOL/L — LOW (ref 0.5–2)
LACTATE SERPL-SCNC: 1.2 MMOL/L — SIGNIFICANT CHANGE UP (ref 0.5–2)
LACTATE SERPL-SCNC: 1.3 MMOL/L — SIGNIFICANT CHANGE UP (ref 0.5–2)
LEUKOCYTE ESTERASE UR-ACNC: ABNORMAL
LYMPHOCYTES # BLD AUTO: 0.49 K/UL — LOW (ref 1–3.3)
LYMPHOCYTES # BLD AUTO: 0.58 K/UL — LOW (ref 1–3.3)
LYMPHOCYTES # BLD AUTO: 1.07 K/UL — SIGNIFICANT CHANGE UP (ref 1–3.3)
LYMPHOCYTES # BLD AUTO: 21.2 % — SIGNIFICANT CHANGE UP (ref 13–44)
LYMPHOCYTES # BLD AUTO: 5.8 % — LOW (ref 13–44)
LYMPHOCYTES # BLD AUTO: 7.3 % — LOW (ref 13–44)
MAGNESIUM SERPL-MCNC: 2.7 MG/DL — HIGH (ref 1.6–2.6)
MCHC RBC-ENTMCNC: 25.1 PG — LOW (ref 27–34)
MCHC RBC-ENTMCNC: 25.3 PG — LOW (ref 27–34)
MCHC RBC-ENTMCNC: 25.4 PG — LOW (ref 27–34)
MCHC RBC-ENTMCNC: 25.8 PG — LOW (ref 27–34)
MCHC RBC-ENTMCNC: 25.9 PG — LOW (ref 27–34)
MCHC RBC-ENTMCNC: 26.2 PG — LOW (ref 27–34)
MCHC RBC-ENTMCNC: 26.3 PG — LOW (ref 27–34)
MCHC RBC-ENTMCNC: 29.8 GM/DL — LOW (ref 32–36)
MCHC RBC-ENTMCNC: 29.8 GM/DL — LOW (ref 32–36)
MCHC RBC-ENTMCNC: 30.2 GM/DL — LOW (ref 32–36)
MCHC RBC-ENTMCNC: 30.4 GM/DL — LOW (ref 32–36)
MCHC RBC-ENTMCNC: 30.8 GM/DL — LOW (ref 32–36)
MCHC RBC-ENTMCNC: 31.2 GM/DL — LOW (ref 32–36)
MCHC RBC-ENTMCNC: 31.3 GM/DL — LOW (ref 32–36)
MCV RBC AUTO: 83.6 FL — SIGNIFICANT CHANGE UP (ref 80–100)
MCV RBC AUTO: 83.7 FL — SIGNIFICANT CHANGE UP (ref 80–100)
MCV RBC AUTO: 83.8 FL — SIGNIFICANT CHANGE UP (ref 80–100)
MCV RBC AUTO: 84 FL — SIGNIFICANT CHANGE UP (ref 80–100)
MCV RBC AUTO: 84.2 FL — SIGNIFICANT CHANGE UP (ref 80–100)
MCV RBC AUTO: 84.2 FL — SIGNIFICANT CHANGE UP (ref 80–100)
MCV RBC AUTO: 86.9 FL — SIGNIFICANT CHANGE UP (ref 80–100)
MONOCYTES # BLD AUTO: 0.38 K/UL — SIGNIFICANT CHANGE UP (ref 0–0.9)
MONOCYTES # BLD AUTO: 0.57 K/UL — SIGNIFICANT CHANGE UP (ref 0–0.9)
MONOCYTES # BLD AUTO: 0.69 K/UL — SIGNIFICANT CHANGE UP (ref 0–0.9)
MONOCYTES NFR BLD AUTO: 6.8 % — SIGNIFICANT CHANGE UP (ref 2–14)
MONOCYTES NFR BLD AUTO: 7.5 % — SIGNIFICANT CHANGE UP (ref 2–14)
MONOCYTES NFR BLD AUTO: 8.6 % — SIGNIFICANT CHANGE UP (ref 2–14)
NEUTROPHILS # BLD AUTO: 3.26 K/UL — SIGNIFICANT CHANGE UP (ref 1.8–7.4)
NEUTROPHILS # BLD AUTO: 6.64 K/UL — SIGNIFICANT CHANGE UP (ref 1.8–7.4)
NEUTROPHILS # BLD AUTO: 7.21 K/UL — SIGNIFICANT CHANGE UP (ref 1.8–7.4)
NEUTROPHILS NFR BLD AUTO: 64.7 % — SIGNIFICANT CHANGE UP (ref 43–77)
NEUTROPHILS NFR BLD AUTO: 83.2 % — HIGH (ref 43–77)
NEUTROPHILS NFR BLD AUTO: 85.6 % — HIGH (ref 43–77)
NITRITE UR-MCNC: POSITIVE
NT-PROBNP SERPL-SCNC: 4596 PG/ML — HIGH (ref 0–300)
PCO2 BLDA: 52 MMHG — HIGH (ref 35–45)
PH BLDA: 7.45 — SIGNIFICANT CHANGE UP (ref 7.35–7.45)
PH UR: 5 — SIGNIFICANT CHANGE UP (ref 5–8)
PLATELET # BLD AUTO: 126 K/UL — LOW (ref 150–400)
PLATELET # BLD AUTO: 127 K/UL — LOW (ref 150–400)
PLATELET # BLD AUTO: 136 K/UL — LOW (ref 150–400)
PLATELET # BLD AUTO: 137 K/UL — LOW (ref 150–400)
PLATELET # BLD AUTO: 140 K/UL — LOW (ref 150–400)
PLATELET # BLD AUTO: 145 K/UL — LOW (ref 150–400)
PLATELET # BLD AUTO: 147 K/UL — LOW (ref 150–400)
PO2 BLDA: 108 MMHG — SIGNIFICANT CHANGE UP (ref 83–108)
POTASSIUM SERPL-MCNC: 3.6 MMOL/L — SIGNIFICANT CHANGE UP (ref 3.5–5.3)
POTASSIUM SERPL-MCNC: 3.8 MMOL/L — SIGNIFICANT CHANGE UP (ref 3.5–5.3)
POTASSIUM SERPL-MCNC: 3.9 MMOL/L — SIGNIFICANT CHANGE UP (ref 3.5–5.3)
POTASSIUM SERPL-MCNC: 4 MMOL/L — SIGNIFICANT CHANGE UP (ref 3.5–5.3)
POTASSIUM SERPL-MCNC: 4 MMOL/L — SIGNIFICANT CHANGE UP (ref 3.5–5.3)
POTASSIUM SERPL-MCNC: 4.3 MMOL/L — SIGNIFICANT CHANGE UP (ref 3.5–5.3)
POTASSIUM SERPL-MCNC: 4.4 MMOL/L — SIGNIFICANT CHANGE UP (ref 3.5–5.3)
POTASSIUM SERPL-MCNC: 5.1 MMOL/L — SIGNIFICANT CHANGE UP (ref 3.5–5.3)
POTASSIUM SERPL-SCNC: 3.6 MMOL/L — SIGNIFICANT CHANGE UP (ref 3.5–5.3)
POTASSIUM SERPL-SCNC: 3.8 MMOL/L — SIGNIFICANT CHANGE UP (ref 3.5–5.3)
POTASSIUM SERPL-SCNC: 3.9 MMOL/L — SIGNIFICANT CHANGE UP (ref 3.5–5.3)
POTASSIUM SERPL-SCNC: 4 MMOL/L — SIGNIFICANT CHANGE UP (ref 3.5–5.3)
POTASSIUM SERPL-SCNC: 4 MMOL/L — SIGNIFICANT CHANGE UP (ref 3.5–5.3)
POTASSIUM SERPL-SCNC: 4.3 MMOL/L — SIGNIFICANT CHANGE UP (ref 3.5–5.3)
POTASSIUM SERPL-SCNC: 4.4 MMOL/L — SIGNIFICANT CHANGE UP (ref 3.5–5.3)
POTASSIUM SERPL-SCNC: 5.1 MMOL/L — SIGNIFICANT CHANGE UP (ref 3.5–5.3)
PROCALCITONIN SERPL-MCNC: 0.13 NG/ML — HIGH (ref 0.02–0.1)
PROT SERPL-MCNC: 6.1 G/DL — LOW (ref 6.6–8.7)
PROT SERPL-MCNC: 6.3 G/DL — LOW (ref 6.6–8.7)
PROT SERPL-MCNC: 6.5 G/DL — LOW (ref 6.6–8.7)
PROT SERPL-MCNC: 7.4 G/DL — SIGNIFICANT CHANGE UP (ref 6.6–8.7)
PROT UR-MCNC: 15
PROTHROM AB SERPL-ACNC: 12.7 SEC — SIGNIFICANT CHANGE UP (ref 10–12.9)
PROTHROM AB SERPL-ACNC: 13.1 SEC — HIGH (ref 10–12.9)
PROTHROM AB SERPL-ACNC: 14 SEC — HIGH (ref 10–12.9)
PROTHROM AB SERPL-ACNC: 15.4 SEC — HIGH (ref 10–12.9)
PROTHROM AB SERPL-ACNC: 23.3 SEC — HIGH (ref 10–12.9)
PROTHROM AB SERPL-ACNC: 24.4 SEC — HIGH (ref 10–12.9)
PROTHROM AB SERPL-ACNC: 24.5 SEC — HIGH (ref 10–12.9)
PROTHROM AB SERPL-ACNC: 25.3 SEC — HIGH (ref 10–12.9)
PROTHROM AB SERPL-ACNC: 31 SEC — HIGH (ref 10–12.9)
PROTHROM AB SERPL-ACNC: 48.9 SEC — HIGH (ref 10–12.9)
PROTHROM AB SERPL-ACNC: 98.4 SEC — HIGH (ref 10–12.9)
RAPID RVP RESULT: SIGNIFICANT CHANGE UP
RBC # BLD: 2.71 M/UL — LOW (ref 3.8–5.2)
RBC # BLD: 2.83 M/UL — LOW (ref 3.8–5.2)
RBC # BLD: 2.91 M/UL — LOW (ref 3.8–5.2)
RBC # BLD: 3 M/UL — LOW (ref 3.8–5.2)
RBC # BLD: 3.16 M/UL — LOW (ref 3.8–5.2)
RBC # BLD: 3.16 M/UL — LOW (ref 3.8–5.2)
RBC # BLD: 3.17 M/UL — LOW (ref 3.8–5.2)
RBC # BLD: 3.51 M/UL — LOW (ref 3.8–5.2)
RBC # FLD: 15.2 % — HIGH (ref 10.3–14.5)
RBC # FLD: 15.3 % — HIGH (ref 10.3–14.5)
RBC # FLD: 15.6 % — HIGH (ref 10.3–14.5)
RBC # FLD: 15.7 % — HIGH (ref 10.3–14.5)
RBC # FLD: 15.9 % — HIGH (ref 10.3–14.5)
RBC # FLD: 15.9 % — HIGH (ref 10.3–14.5)
RBC # FLD: 16.2 % — HIGH (ref 10.3–14.5)
RBC CASTS # UR COMP ASSIST: SIGNIFICANT CHANGE UP /HPF (ref 0–4)
RETICS #: 50.9 K/UL — SIGNIFICANT CHANGE UP (ref 25–125)
RETICS/RBC NFR: 1.6 % — SIGNIFICANT CHANGE UP (ref 0.5–2.5)
SAO2 % BLDA: 99 % — SIGNIFICANT CHANGE UP (ref 95–99)
SODIUM SERPL-SCNC: 133 MMOL/L — LOW (ref 135–145)
SODIUM SERPL-SCNC: 134 MMOL/L — LOW (ref 135–145)
SODIUM SERPL-SCNC: 134 MMOL/L — LOW (ref 135–145)
SODIUM SERPL-SCNC: 136 MMOL/L — SIGNIFICANT CHANGE UP (ref 135–145)
SODIUM SERPL-SCNC: 137 MMOL/L — SIGNIFICANT CHANGE UP (ref 135–145)
SODIUM SERPL-SCNC: 137 MMOL/L — SIGNIFICANT CHANGE UP (ref 135–145)
SP GR SPEC: 1.02 — SIGNIFICANT CHANGE UP (ref 1.01–1.02)
SPECIMEN SOURCE: SIGNIFICANT CHANGE UP
TIBC SERPL-MCNC: 220 UG/DL — SIGNIFICANT CHANGE UP (ref 220–430)
TRANSFERRIN SERPL-MCNC: 154 MG/DL — LOW (ref 192–382)
TROPONIN T SERPL-MCNC: 0.02 NG/ML — SIGNIFICANT CHANGE UP (ref 0–0.06)
UROBILINOGEN FLD QL: NEGATIVE — SIGNIFICANT CHANGE UP
WBC # BLD: 4.25 K/UL — SIGNIFICANT CHANGE UP (ref 3.8–10.5)
WBC # BLD: 5.04 K/UL — SIGNIFICANT CHANGE UP (ref 3.8–10.5)
WBC # BLD: 5.29 K/UL — SIGNIFICANT CHANGE UP (ref 3.8–10.5)
WBC # BLD: 5.58 K/UL — SIGNIFICANT CHANGE UP (ref 3.8–10.5)
WBC # BLD: 6.09 K/UL — SIGNIFICANT CHANGE UP (ref 3.8–10.5)
WBC # BLD: 7.98 K/UL — SIGNIFICANT CHANGE UP (ref 3.8–10.5)
WBC # BLD: 8.42 K/UL — SIGNIFICANT CHANGE UP (ref 3.8–10.5)
WBC # FLD AUTO: 4.25 K/UL — SIGNIFICANT CHANGE UP (ref 3.8–10.5)
WBC # FLD AUTO: 5.04 K/UL — SIGNIFICANT CHANGE UP (ref 3.8–10.5)
WBC # FLD AUTO: 5.29 K/UL — SIGNIFICANT CHANGE UP (ref 3.8–10.5)
WBC # FLD AUTO: 5.58 K/UL — SIGNIFICANT CHANGE UP (ref 3.8–10.5)
WBC # FLD AUTO: 6.09 K/UL — SIGNIFICANT CHANGE UP (ref 3.8–10.5)
WBC # FLD AUTO: 7.98 K/UL — SIGNIFICANT CHANGE UP (ref 3.8–10.5)
WBC # FLD AUTO: 8.42 K/UL — SIGNIFICANT CHANGE UP (ref 3.8–10.5)
WBC UR QL: SIGNIFICANT CHANGE UP

## 2020-01-01 PROCEDURE — 83550 IRON BINDING TEST: CPT

## 2020-01-01 PROCEDURE — 99232 SBSQ HOSP IP/OBS MODERATE 35: CPT

## 2020-01-01 PROCEDURE — 71045 X-RAY EXAM CHEST 1 VIEW: CPT | Mod: 26

## 2020-01-01 PROCEDURE — 73700 CT LOWER EXTREMITY W/O DYE: CPT

## 2020-01-01 PROCEDURE — 97110 THERAPEUTIC EXERCISES: CPT

## 2020-01-01 PROCEDURE — 96366 THER/PROPH/DIAG IV INF ADDON: CPT

## 2020-01-01 PROCEDURE — 99233 SBSQ HOSP IP/OBS HIGH 50: CPT

## 2020-01-01 PROCEDURE — 84145 PROCALCITONIN (PCT): CPT

## 2020-01-01 PROCEDURE — 96375 TX/PRO/DX INJ NEW DRUG ADDON: CPT

## 2020-01-01 PROCEDURE — 70450 CT HEAD/BRAIN W/O DYE: CPT | Mod: 26

## 2020-01-01 PROCEDURE — 36415 COLL VENOUS BLD VENIPUNCTURE: CPT

## 2020-01-01 PROCEDURE — 99214 OFFICE O/P EST MOD 30 MIN: CPT

## 2020-01-01 PROCEDURE — 82728 ASSAY OF FERRITIN: CPT

## 2020-01-01 PROCEDURE — 99223 1ST HOSP IP/OBS HIGH 75: CPT

## 2020-01-01 PROCEDURE — 87486 CHLMYD PNEUM DNA AMP PROBE: CPT

## 2020-01-01 PROCEDURE — 82803 BLOOD GASES ANY COMBINATION: CPT

## 2020-01-01 PROCEDURE — 71250 CT THORAX DX C-: CPT | Mod: 26

## 2020-01-01 PROCEDURE — 81001 URINALYSIS AUTO W/SCOPE: CPT

## 2020-01-01 PROCEDURE — 71250 CT THORAX DX C-: CPT

## 2020-01-01 PROCEDURE — 70450 CT HEAD/BRAIN W/O DYE: CPT

## 2020-01-01 PROCEDURE — 93010 ELECTROCARDIOGRAM REPORT: CPT

## 2020-01-01 PROCEDURE — 82962 GLUCOSE BLOOD TEST: CPT

## 2020-01-01 PROCEDURE — 87581 M.PNEUMON DNA AMP PROBE: CPT

## 2020-01-01 PROCEDURE — 87633 RESP VIRUS 12-25 TARGETS: CPT

## 2020-01-01 PROCEDURE — 85027 COMPLETE CBC AUTOMATED: CPT

## 2020-01-01 PROCEDURE — 96365 THER/PROPH/DIAG IV INF INIT: CPT

## 2020-01-01 PROCEDURE — 99214 OFFICE O/P EST MOD 30 MIN: CPT | Mod: 95

## 2020-01-01 PROCEDURE — 80053 COMPREHEN METABOLIC PANEL: CPT

## 2020-01-01 PROCEDURE — 73630 X-RAY EXAM OF FOOT: CPT

## 2020-01-01 PROCEDURE — 87798 DETECT AGENT NOS DNA AMP: CPT

## 2020-01-01 PROCEDURE — 85014 HEMATOCRIT: CPT

## 2020-01-01 PROCEDURE — 72125 CT NECK SPINE W/O DYE: CPT

## 2020-01-01 PROCEDURE — 76705 ECHO EXAM OF ABDOMEN: CPT

## 2020-01-01 PROCEDURE — 71045 X-RAY EXAM CHEST 1 VIEW: CPT

## 2020-01-01 PROCEDURE — 83880 ASSAY OF NATRIURETIC PEPTIDE: CPT

## 2020-01-01 PROCEDURE — 85610 PROTHROMBIN TIME: CPT

## 2020-01-01 PROCEDURE — 99285 EMERGENCY DEPT VISIT HI MDM: CPT

## 2020-01-01 PROCEDURE — 84484 ASSAY OF TROPONIN QUANT: CPT

## 2020-01-01 PROCEDURE — 99285 EMERGENCY DEPT VISIT HI MDM: CPT | Mod: 25

## 2020-01-01 PROCEDURE — 82947 ASSAY GLUCOSE BLOOD QUANT: CPT

## 2020-01-01 PROCEDURE — 97530 THERAPEUTIC ACTIVITIES: CPT

## 2020-01-01 PROCEDURE — 83540 ASSAY OF IRON: CPT

## 2020-01-01 PROCEDURE — 97163 PT EVAL HIGH COMPLEX 45 MIN: CPT

## 2020-01-01 PROCEDURE — 85045 AUTOMATED RETICULOCYTE COUNT: CPT

## 2020-01-01 PROCEDURE — 73620 X-RAY EXAM OF FOOT: CPT | Mod: 26,RT

## 2020-01-01 PROCEDURE — 93000 ELECTROCARDIOGRAM COMPLETE: CPT

## 2020-01-01 PROCEDURE — 82435 ASSAY OF BLOOD CHLORIDE: CPT

## 2020-01-01 PROCEDURE — 72125 CT NECK SPINE W/O DYE: CPT | Mod: 26

## 2020-01-01 PROCEDURE — 83605 ASSAY OF LACTIC ACID: CPT

## 2020-01-01 PROCEDURE — 76705 ECHO EXAM OF ABDOMEN: CPT | Mod: 26

## 2020-01-01 PROCEDURE — 73700 CT LOWER EXTREMITY W/O DYE: CPT | Mod: 26,LT,76

## 2020-01-01 PROCEDURE — 99497 ADVNCD CARE PLAN 30 MIN: CPT | Mod: 25

## 2020-01-01 PROCEDURE — 82330 ASSAY OF CALCIUM: CPT

## 2020-01-01 PROCEDURE — 93005 ELECTROCARDIOGRAM TRACING: CPT

## 2020-01-01 PROCEDURE — 99222 1ST HOSP IP/OBS MODERATE 55: CPT

## 2020-01-01 PROCEDURE — 99239 HOSP IP/OBS DSCHRG MGMT >30: CPT

## 2020-01-01 PROCEDURE — 73630 X-RAY EXAM OF FOOT: CPT | Mod: 26,LT

## 2020-01-01 PROCEDURE — 85730 THROMBOPLASTIN TIME PARTIAL: CPT

## 2020-01-01 PROCEDURE — 84132 ASSAY OF SERUM POTASSIUM: CPT

## 2020-01-01 PROCEDURE — 84466 ASSAY OF TRANSFERRIN: CPT

## 2020-01-01 PROCEDURE — 84295 ASSAY OF SERUM SODIUM: CPT

## 2020-01-01 PROCEDURE — 87040 BLOOD CULTURE FOR BACTERIA: CPT

## 2020-01-01 PROCEDURE — 83735 ASSAY OF MAGNESIUM: CPT

## 2020-01-01 PROCEDURE — 80048 BASIC METABOLIC PNL TOTAL CA: CPT

## 2020-01-01 PROCEDURE — 73620 X-RAY EXAM OF FOOT: CPT

## 2020-01-01 PROCEDURE — 94640 AIRWAY INHALATION TREATMENT: CPT

## 2020-01-01 RX ORDER — ACETAMINOPHEN 500 MG
650 TABLET ORAL ONCE
Refills: 0 | Status: COMPLETED | OUTPATIENT
Start: 2020-01-01 | End: 2020-01-01

## 2020-01-01 RX ORDER — ATORVASTATIN CALCIUM 80 MG/1
10 TABLET, FILM COATED ORAL AT BEDTIME
Refills: 0 | Status: DISCONTINUED | OUTPATIENT
Start: 2020-01-01 | End: 2020-01-01

## 2020-01-01 RX ORDER — CHOLECALCIFEROL (VITAMIN D3) 125 MCG
1 CAPSULE ORAL
Qty: 0 | Refills: 0 | DISCHARGE

## 2020-01-01 RX ORDER — METOLAZONE 2.5 MG/1
2.5 TABLET ORAL DAILY
Qty: 90 | Refills: 3 | Status: DISCONTINUED | COMMUNITY
Start: 2018-02-20 | End: 2020-01-01

## 2020-01-01 RX ORDER — SERTRALINE 25 MG/1
50 TABLET, FILM COATED ORAL DAILY
Refills: 0 | Status: DISCONTINUED | OUTPATIENT
Start: 2020-01-01 | End: 2020-01-01

## 2020-01-01 RX ORDER — WARFARIN 4 MG/1
4 TABLET ORAL
Refills: 0 | Status: ACTIVE | COMMUNITY
Start: 2019-01-01

## 2020-01-01 RX ORDER — SODIUM CHLORIDE 9 MG/ML
250 INJECTION INTRAMUSCULAR; INTRAVENOUS; SUBCUTANEOUS
Refills: 0 | Status: COMPLETED | OUTPATIENT
Start: 2020-01-01 | End: 2020-01-01

## 2020-01-01 RX ORDER — AZITHROMYCIN 500 MG/1
500 TABLET, FILM COATED ORAL ONCE
Refills: 0 | Status: COMPLETED | OUTPATIENT
Start: 2020-01-01 | End: 2020-01-01

## 2020-01-01 RX ORDER — FUROSEMIDE 40 MG
1 TABLET ORAL
Qty: 0 | Refills: 0 | DISCHARGE

## 2020-01-01 RX ORDER — AZITHROMYCIN 500 MG/1
TABLET, FILM COATED ORAL
Refills: 0 | Status: DISCONTINUED | OUTPATIENT
Start: 2020-01-01 | End: 2020-01-01

## 2020-01-01 RX ORDER — SODIUM CHLORIDE 9 MG/ML
1000 INJECTION INTRAMUSCULAR; INTRAVENOUS; SUBCUTANEOUS ONCE
Refills: 0 | Status: COMPLETED | OUTPATIENT
Start: 2020-01-01 | End: 2020-01-01

## 2020-01-01 RX ORDER — PIPERACILLIN AND TAZOBACTAM 4; .5 G/20ML; G/20ML
3.38 INJECTION, POWDER, LYOPHILIZED, FOR SOLUTION INTRAVENOUS ONCE
Refills: 0 | Status: COMPLETED | OUTPATIENT
Start: 2020-01-01 | End: 2020-01-01

## 2020-01-01 RX ORDER — POTASSIUM CHLORIDE 20 MEQ
0 PACKET (EA) ORAL
Qty: 0 | Refills: 0 | DISCHARGE

## 2020-01-01 RX ORDER — MAGNESIUM HYDROXIDE 400 MG/5ML
400 SUSPENSION ORAL
Refills: 0 | Status: ACTIVE | COMMUNITY

## 2020-01-01 RX ORDER — AZITHROMYCIN 500 MG/1
500 TABLET, FILM COATED ORAL EVERY 24 HOURS
Refills: 0 | Status: DISCONTINUED | OUTPATIENT
Start: 2020-01-01 | End: 2020-01-01

## 2020-01-01 RX ORDER — BOSENTAN 125 MG/1
125 TABLET, FILM COATED ORAL
Refills: 0 | Status: DISCONTINUED | OUTPATIENT
Start: 2020-01-01 | End: 2020-01-01

## 2020-01-01 RX ORDER — VANCOMYCIN HCL 1 G
VIAL (EA) INTRAVENOUS
Refills: 0 | Status: DISCONTINUED | OUTPATIENT
Start: 2020-01-01 | End: 2020-01-01

## 2020-01-01 RX ORDER — ACETAMINOPHEN 500 MG
1000 TABLET ORAL ONCE
Refills: 0 | Status: COMPLETED | OUTPATIENT
Start: 2020-01-01 | End: 2020-01-01

## 2020-01-01 RX ORDER — BOSENTAN 125 MG/1
125 TABLET, COATED ORAL TWICE DAILY
Qty: 180 | Refills: 3 | Status: ACTIVE | COMMUNITY
Start: 2020-01-01 | End: 1900-01-01

## 2020-01-01 RX ORDER — LACTOBACILLUS ACIDOPHILUS 100MM CELL
1 CAPSULE ORAL
Refills: 0 | Status: DISCONTINUED | OUTPATIENT
Start: 2020-01-01 | End: 2020-01-01

## 2020-01-01 RX ORDER — METOPROLOL TARTRATE 50 MG
25 TABLET ORAL
Refills: 0 | Status: DISCONTINUED | OUTPATIENT
Start: 2020-01-01 | End: 2020-01-01

## 2020-01-01 RX ORDER — WARFARIN SODIUM 2.5 MG/1
4 TABLET ORAL ONCE
Refills: 0 | Status: COMPLETED | OUTPATIENT
Start: 2020-01-01 | End: 2020-01-01

## 2020-01-01 RX ORDER — BOSENTAN 125 MG/1
1 TABLET, FILM COATED ORAL
Qty: 0 | Refills: 0 | DISCHARGE

## 2020-01-01 RX ORDER — SODIUM CHLORIDE 9 MG/ML
1000 INJECTION, SOLUTION INTRAVENOUS
Refills: 0 | Status: DISCONTINUED | OUTPATIENT
Start: 2020-01-01 | End: 2020-01-01

## 2020-01-01 RX ORDER — FERROUS SULFATE 325(65) MG
1 TABLET ORAL
Qty: 0 | Refills: 0 | DISCHARGE

## 2020-01-01 RX ORDER — METOPROLOL TARTRATE 50 MG
1 TABLET ORAL
Qty: 0 | Refills: 0 | DISCHARGE

## 2020-01-01 RX ORDER — NYSTATIN CREAM 100000 [USP'U]/G
1 CREAM TOPICAL
Refills: 0 | Status: DISCONTINUED | OUTPATIENT
Start: 2020-01-01 | End: 2020-01-01

## 2020-01-01 RX ORDER — ENEMA 19; 7 G/133ML; G/133ML
7-19 ENEMA RECTAL
Refills: 0 | Status: ACTIVE | COMMUNITY

## 2020-01-01 RX ORDER — SERTRALINE HYDROCHLORIDE 100 MG/1
100 TABLET, FILM COATED ORAL
Qty: 90 | Refills: 0 | Status: DISCONTINUED | COMMUNITY
Start: 2019-01-01 | End: 2020-01-01

## 2020-01-01 RX ORDER — PIPERACILLIN AND TAZOBACTAM 4; .5 G/20ML; G/20ML
3.38 INJECTION, POWDER, LYOPHILIZED, FOR SOLUTION INTRAVENOUS EVERY 8 HOURS
Refills: 0 | Status: DISCONTINUED | OUTPATIENT
Start: 2020-01-01 | End: 2020-01-01

## 2020-01-01 RX ORDER — DIGOXIN 250 MCG
1 TABLET ORAL
Qty: 0 | Refills: 0 | DISCHARGE
Start: 2020-01-01

## 2020-01-01 RX ORDER — PIPERACILLIN AND TAZOBACTAM 4; .5 G/20ML; G/20ML
3.38 INJECTION, POWDER, LYOPHILIZED, FOR SOLUTION INTRAVENOUS EVERY 8 HOURS
Refills: 0 | Status: COMPLETED | OUTPATIENT
Start: 2020-01-01 | End: 2020-01-01

## 2020-01-01 RX ORDER — MAGNESIUM OXIDE 400 MG ORAL TABLET 241.3 MG
1 TABLET ORAL
Qty: 0 | Refills: 0 | DISCHARGE

## 2020-01-01 RX ORDER — IPRATROPIUM/ALBUTEROL SULFATE 18-103MCG
3 AEROSOL WITH ADAPTER (GRAM) INHALATION EVERY 6 HOURS
Refills: 0 | Status: DISCONTINUED | OUTPATIENT
Start: 2020-01-01 | End: 2020-01-01

## 2020-01-01 RX ORDER — POTASSIUM CHLORIDE 600 MG/1
8 CAPSULE, EXTENDED RELEASE ORAL DAILY
Qty: 180 | Refills: 2 | Status: DISCONTINUED | COMMUNITY
Start: 2019-05-13 | End: 2020-01-01

## 2020-01-01 RX ORDER — WARFARIN SODIUM 2.5 MG/1
1 TABLET ORAL
Qty: 0 | Refills: 0 | DISCHARGE

## 2020-01-01 RX ORDER — FUROSEMIDE 40 MG/1
40 TABLET ORAL
Qty: 90 | Refills: 3 | Status: DISCONTINUED | COMMUNITY
Start: 2018-07-30 | End: 2020-01-01

## 2020-01-01 RX ORDER — ACETAMINOPHEN 500 MG
650 TABLET ORAL EVERY 6 HOURS
Refills: 0 | Status: DISCONTINUED | OUTPATIENT
Start: 2020-01-01 | End: 2020-01-01

## 2020-01-01 RX ORDER — PHYTONADIONE (VIT K1) 5 MG
5 TABLET ORAL ONCE
Refills: 0 | Status: COMPLETED | OUTPATIENT
Start: 2020-01-01 | End: 2020-01-01

## 2020-01-01 RX ORDER — WARFARIN SODIUM 2.5 MG/1
4 TABLET ORAL DAILY
Refills: 0 | Status: DISCONTINUED | OUTPATIENT
Start: 2020-01-01 | End: 2020-01-01

## 2020-01-01 RX ORDER — SERTRALINE 25 MG/1
1 TABLET, FILM COATED ORAL
Qty: 0 | Refills: 0 | DISCHARGE

## 2020-01-01 RX ORDER — FUROSEMIDE 40 MG
40 TABLET ORAL DAILY
Refills: 0 | Status: DISCONTINUED | OUTPATIENT
Start: 2020-01-01 | End: 2020-01-01

## 2020-01-01 RX ORDER — ATORVASTATIN CALCIUM 80 MG/1
1 TABLET, FILM COATED ORAL
Qty: 0 | Refills: 0 | DISCHARGE

## 2020-01-01 RX ORDER — ACETAMINOPHEN 325 MG/1
325 TABLET ORAL EVERY 6 HOURS
Refills: 0 | Status: ACTIVE | COMMUNITY

## 2020-01-01 RX ORDER — BOSENTAN 125 MG/1
125 TABLET, COATED ORAL TWICE DAILY
Qty: 180 | Refills: 3 | Status: DISCONTINUED | COMMUNITY
End: 2020-01-01

## 2020-01-01 RX ORDER — DOCUSATE SODIUM 100 MG/1
100 CAPSULE ORAL AT BEDTIME
Refills: 0 | Status: ACTIVE | COMMUNITY

## 2020-01-01 RX ORDER — WARFARIN 2 MG/1
2 TABLET ORAL
Qty: 90 | Refills: 3 | Status: DISCONTINUED | COMMUNITY
Start: 2017-12-21 | End: 2020-01-01

## 2020-01-01 RX ORDER — DIGOXIN 250 MCG
0.12 TABLET ORAL DAILY
Refills: 0 | Status: DISCONTINUED | OUTPATIENT
Start: 2020-01-01 | End: 2020-01-01

## 2020-01-01 RX ORDER — OMEGA-3/DHA/EPA/FISH OIL 300-1000MG
400 CAPSULE ORAL
Refills: 0 | Status: ACTIVE | COMMUNITY

## 2020-01-01 RX ORDER — DIGOXIN 250 MCG
1 TABLET ORAL
Qty: 0 | Refills: 0 | DISCHARGE

## 2020-01-01 RX ADMIN — NYSTATIN CREAM 1 APPLICATION(S): 100000 CREAM TOPICAL at 06:28

## 2020-01-01 RX ADMIN — BOSENTAN 125 MILLIGRAM(S): 125 TABLET, FILM COATED ORAL at 05:41

## 2020-01-01 RX ADMIN — Medication 100 MILLIGRAM(S): at 18:06

## 2020-01-01 RX ADMIN — Medication 100 MILLIGRAM(S): at 18:12

## 2020-01-01 RX ADMIN — PIPERACILLIN AND TAZOBACTAM 25 GRAM(S): 4; .5 INJECTION, POWDER, LYOPHILIZED, FOR SOLUTION INTRAVENOUS at 20:10

## 2020-01-01 RX ADMIN — Medication 3 MILLILITER(S): at 03:13

## 2020-01-01 RX ADMIN — Medication 100 MILLIGRAM(S): at 17:45

## 2020-01-01 RX ADMIN — Medication 3 MILLILITER(S): at 03:31

## 2020-01-01 RX ADMIN — PIPERACILLIN AND TAZOBACTAM 25 GRAM(S): 4; .5 INJECTION, POWDER, LYOPHILIZED, FOR SOLUTION INTRAVENOUS at 07:57

## 2020-01-01 RX ADMIN — NYSTATIN CREAM 1 APPLICATION(S): 100000 CREAM TOPICAL at 05:09

## 2020-01-01 RX ADMIN — BOSENTAN 125 MILLIGRAM(S): 125 TABLET, FILM COATED ORAL at 19:47

## 2020-01-01 RX ADMIN — ATORVASTATIN CALCIUM 10 MILLIGRAM(S): 80 TABLET, FILM COATED ORAL at 20:58

## 2020-01-01 RX ADMIN — PIPERACILLIN AND TAZOBACTAM 25 GRAM(S): 4; .5 INJECTION, POWDER, LYOPHILIZED, FOR SOLUTION INTRAVENOUS at 00:01

## 2020-01-01 RX ADMIN — Medication 1 TABLET(S): at 08:25

## 2020-01-01 RX ADMIN — Medication 3 MILLILITER(S): at 09:07

## 2020-01-01 RX ADMIN — Medication 100 MILLIGRAM(S): at 05:39

## 2020-01-01 RX ADMIN — Medication 3 MILLILITER(S): at 20:14

## 2020-01-01 RX ADMIN — PIPERACILLIN AND TAZOBACTAM 25 GRAM(S): 4; .5 INJECTION, POWDER, LYOPHILIZED, FOR SOLUTION INTRAVENOUS at 16:42

## 2020-01-01 RX ADMIN — NYSTATIN CREAM 1 APPLICATION(S): 100000 CREAM TOPICAL at 18:06

## 2020-01-01 RX ADMIN — Medication 600 MILLIGRAM(S): at 18:54

## 2020-01-01 RX ADMIN — Medication 300 MILLIGRAM(S): at 14:44

## 2020-01-01 RX ADMIN — BOSENTAN 125 MILLIGRAM(S): 125 TABLET, FILM COATED ORAL at 06:25

## 2020-01-01 RX ADMIN — Medication 25 MILLIGRAM(S): at 05:05

## 2020-01-01 RX ADMIN — Medication 3 MILLILITER(S): at 09:50

## 2020-01-01 RX ADMIN — AZITHROMYCIN 255 MILLIGRAM(S): 500 TABLET, FILM COATED ORAL at 12:06

## 2020-01-01 RX ADMIN — Medication 3 MILLILITER(S): at 03:44

## 2020-01-01 RX ADMIN — NYSTATIN CREAM 1 APPLICATION(S): 100000 CREAM TOPICAL at 06:24

## 2020-01-01 RX ADMIN — ATORVASTATIN CALCIUM 10 MILLIGRAM(S): 80 TABLET, FILM COATED ORAL at 23:17

## 2020-01-01 RX ADMIN — Medication 3 MILLILITER(S): at 03:41

## 2020-01-01 RX ADMIN — Medication 650 MILLIGRAM(S): at 18:17

## 2020-01-01 RX ADMIN — Medication 3 MILLILITER(S): at 20:40

## 2020-01-01 RX ADMIN — PIPERACILLIN AND TAZOBACTAM 25 GRAM(S): 4; .5 INJECTION, POWDER, LYOPHILIZED, FOR SOLUTION INTRAVENOUS at 03:33

## 2020-01-01 RX ADMIN — WARFARIN SODIUM 4 MILLIGRAM(S): 2.5 TABLET ORAL at 21:16

## 2020-01-01 RX ADMIN — Medication 0.12 MILLIGRAM(S): at 06:18

## 2020-01-01 RX ADMIN — ATORVASTATIN CALCIUM 10 MILLIGRAM(S): 80 TABLET, FILM COATED ORAL at 21:39

## 2020-01-01 RX ADMIN — AZITHROMYCIN 255 MILLIGRAM(S): 500 TABLET, FILM COATED ORAL at 13:36

## 2020-01-01 RX ADMIN — Medication 101 MILLIGRAM(S): at 11:38

## 2020-01-01 RX ADMIN — Medication 100 MILLIGRAM(S): at 06:25

## 2020-01-01 RX ADMIN — BOSENTAN 125 MILLIGRAM(S): 125 TABLET, FILM COATED ORAL at 05:09

## 2020-01-01 RX ADMIN — Medication 100 MILLIGRAM(S): at 06:17

## 2020-01-01 RX ADMIN — ATORVASTATIN CALCIUM 10 MILLIGRAM(S): 80 TABLET, FILM COATED ORAL at 21:46

## 2020-01-01 RX ADMIN — Medication 25 MILLIGRAM(S): at 17:45

## 2020-01-01 RX ADMIN — PIPERACILLIN AND TAZOBACTAM 25 GRAM(S): 4; .5 INJECTION, POWDER, LYOPHILIZED, FOR SOLUTION INTRAVENOUS at 04:24

## 2020-01-01 RX ADMIN — Medication 100 MILLIGRAM(S): at 17:07

## 2020-01-01 RX ADMIN — AZITHROMYCIN 255 MILLIGRAM(S): 500 TABLET, FILM COATED ORAL at 18:13

## 2020-01-01 RX ADMIN — AZITHROMYCIN 255 MILLIGRAM(S): 500 TABLET, FILM COATED ORAL at 19:47

## 2020-01-01 RX ADMIN — Medication 1 TABLET(S): at 18:06

## 2020-01-01 RX ADMIN — Medication 100 MILLIGRAM(S): at 06:24

## 2020-01-01 RX ADMIN — SERTRALINE 50 MILLIGRAM(S): 25 TABLET, FILM COATED ORAL at 13:09

## 2020-01-01 RX ADMIN — Medication 3 MILLILITER(S): at 08:41

## 2020-01-01 RX ADMIN — Medication 1 TABLET(S): at 17:08

## 2020-01-01 RX ADMIN — Medication 3 MILLILITER(S): at 08:43

## 2020-01-01 RX ADMIN — NYSTATIN CREAM 1 APPLICATION(S): 100000 CREAM TOPICAL at 06:20

## 2020-01-01 RX ADMIN — Medication 3 MILLILITER(S): at 15:32

## 2020-01-01 RX ADMIN — Medication 650 MILLIGRAM(S): at 19:00

## 2020-01-01 RX ADMIN — BOSENTAN 125 MILLIGRAM(S): 125 TABLET, FILM COATED ORAL at 18:12

## 2020-01-01 RX ADMIN — Medication 650 MILLIGRAM(S): at 18:12

## 2020-01-01 RX ADMIN — PIPERACILLIN AND TAZOBACTAM 25 GRAM(S): 4; .5 INJECTION, POWDER, LYOPHILIZED, FOR SOLUTION INTRAVENOUS at 17:52

## 2020-01-01 RX ADMIN — PIPERACILLIN AND TAZOBACTAM 25 GRAM(S): 4; .5 INJECTION, POWDER, LYOPHILIZED, FOR SOLUTION INTRAVENOUS at 12:35

## 2020-01-01 RX ADMIN — Medication 0.12 MILLIGRAM(S): at 05:40

## 2020-01-01 RX ADMIN — Medication 40 MILLIGRAM(S): at 06:19

## 2020-01-01 RX ADMIN — Medication 0.12 MILLIGRAM(S): at 06:28

## 2020-01-01 RX ADMIN — NYSTATIN CREAM 1 APPLICATION(S): 100000 CREAM TOPICAL at 05:18

## 2020-01-01 RX ADMIN — BOSENTAN 125 MILLIGRAM(S): 125 TABLET, FILM COATED ORAL at 05:17

## 2020-01-01 RX ADMIN — Medication 25 MILLIGRAM(S): at 05:17

## 2020-01-01 RX ADMIN — SERTRALINE 50 MILLIGRAM(S): 25 TABLET, FILM COATED ORAL at 12:05

## 2020-01-01 RX ADMIN — NYSTATIN CREAM 1 APPLICATION(S): 100000 CREAM TOPICAL at 17:08

## 2020-01-01 RX ADMIN — BOSENTAN 125 MILLIGRAM(S): 125 TABLET, FILM COATED ORAL at 17:08

## 2020-01-01 RX ADMIN — ATORVASTATIN CALCIUM 10 MILLIGRAM(S): 80 TABLET, FILM COATED ORAL at 21:40

## 2020-01-01 RX ADMIN — Medication 1 TABLET(S): at 17:45

## 2020-01-01 RX ADMIN — Medication 400 MILLIGRAM(S): at 12:28

## 2020-01-01 RX ADMIN — Medication 3 MILLILITER(S): at 03:49

## 2020-01-01 RX ADMIN — SERTRALINE 50 MILLIGRAM(S): 25 TABLET, FILM COATED ORAL at 12:31

## 2020-01-01 RX ADMIN — Medication 25 MILLIGRAM(S): at 17:08

## 2020-01-01 RX ADMIN — NYSTATIN CREAM 1 APPLICATION(S): 100000 CREAM TOPICAL at 05:28

## 2020-01-01 RX ADMIN — BOSENTAN 125 MILLIGRAM(S): 125 TABLET, FILM COATED ORAL at 17:36

## 2020-01-01 RX ADMIN — Medication 0.12 MILLIGRAM(S): at 06:04

## 2020-01-01 RX ADMIN — Medication 25 MILLIGRAM(S): at 18:27

## 2020-01-01 RX ADMIN — Medication 3 MILLILITER(S): at 15:46

## 2020-01-01 RX ADMIN — Medication 0.12 MILLIGRAM(S): at 06:24

## 2020-01-01 RX ADMIN — Medication 3 MILLILITER(S): at 20:52

## 2020-01-01 RX ADMIN — Medication 100 MILLIGRAM(S): at 17:40

## 2020-01-01 RX ADMIN — AZITHROMYCIN 255 MILLIGRAM(S): 500 TABLET, FILM COATED ORAL at 13:22

## 2020-01-01 RX ADMIN — Medication 0.12 MILLIGRAM(S): at 05:09

## 2020-01-01 RX ADMIN — Medication 1 TABLET(S): at 09:28

## 2020-01-01 RX ADMIN — PIPERACILLIN AND TAZOBACTAM 200 GRAM(S): 4; .5 INJECTION, POWDER, LYOPHILIZED, FOR SOLUTION INTRAVENOUS at 17:34

## 2020-01-01 RX ADMIN — Medication 1 TABLET(S): at 17:36

## 2020-01-01 RX ADMIN — BOSENTAN 125 MILLIGRAM(S): 125 TABLET, FILM COATED ORAL at 18:07

## 2020-01-01 RX ADMIN — ATORVASTATIN CALCIUM 10 MILLIGRAM(S): 80 TABLET, FILM COATED ORAL at 21:16

## 2020-01-01 RX ADMIN — SODIUM CHLORIDE 500 MILLILITER(S): 9 INJECTION INTRAMUSCULAR; INTRAVENOUS; SUBCUTANEOUS at 16:21

## 2020-01-01 RX ADMIN — Medication 3 MILLILITER(S): at 14:56

## 2020-01-01 RX ADMIN — Medication 1 TABLET(S): at 08:46

## 2020-01-01 RX ADMIN — BOSENTAN 125 MILLIGRAM(S): 125 TABLET, FILM COATED ORAL at 05:28

## 2020-01-01 RX ADMIN — WARFARIN SODIUM 4 MILLIGRAM(S): 2.5 TABLET ORAL at 21:40

## 2020-01-01 RX ADMIN — SERTRALINE 50 MILLIGRAM(S): 25 TABLET, FILM COATED ORAL at 13:22

## 2020-01-01 RX ADMIN — Medication 100 MILLIGRAM(S): at 06:05

## 2020-01-01 RX ADMIN — Medication 3 MILLILITER(S): at 15:16

## 2020-01-01 RX ADMIN — Medication 3 MILLILITER(S): at 03:48

## 2020-01-01 RX ADMIN — PIPERACILLIN AND TAZOBACTAM 25 GRAM(S): 4; .5 INJECTION, POWDER, LYOPHILIZED, FOR SOLUTION INTRAVENOUS at 08:46

## 2020-01-01 RX ADMIN — NYSTATIN CREAM 1 APPLICATION(S): 100000 CREAM TOPICAL at 05:41

## 2020-01-01 RX ADMIN — Medication 3 MILLILITER(S): at 09:01

## 2020-01-01 RX ADMIN — Medication 650 MILLIGRAM(S): at 17:17

## 2020-01-01 RX ADMIN — Medication 650 MILLIGRAM(S): at 09:13

## 2020-01-01 RX ADMIN — Medication 1 TABLET(S): at 08:44

## 2020-01-01 RX ADMIN — ATORVASTATIN CALCIUM 10 MILLIGRAM(S): 80 TABLET, FILM COATED ORAL at 22:33

## 2020-01-01 RX ADMIN — ATORVASTATIN CALCIUM 10 MILLIGRAM(S): 80 TABLET, FILM COATED ORAL at 22:22

## 2020-01-01 RX ADMIN — BOSENTAN 125 MILLIGRAM(S): 125 TABLET, FILM COATED ORAL at 06:04

## 2020-01-01 RX ADMIN — Medication 0.12 MILLIGRAM(S): at 05:17

## 2020-01-01 RX ADMIN — Medication 0.12 MILLIGRAM(S): at 05:05

## 2020-01-01 RX ADMIN — Medication 0.12 MILLIGRAM(S): at 05:29

## 2020-01-01 RX ADMIN — NYSTATIN CREAM 1 APPLICATION(S): 100000 CREAM TOPICAL at 06:04

## 2020-01-01 RX ADMIN — Medication 25 MILLIGRAM(S): at 18:07

## 2020-01-01 RX ADMIN — Medication 1 TABLET(S): at 07:57

## 2020-01-01 RX ADMIN — SERTRALINE 50 MILLIGRAM(S): 25 TABLET, FILM COATED ORAL at 08:44

## 2020-01-01 RX ADMIN — PIPERACILLIN AND TAZOBACTAM 25 GRAM(S): 4; .5 INJECTION, POWDER, LYOPHILIZED, FOR SOLUTION INTRAVENOUS at 12:48

## 2020-01-01 RX ADMIN — PIPERACILLIN AND TAZOBACTAM 25 GRAM(S): 4; .5 INJECTION, POWDER, LYOPHILIZED, FOR SOLUTION INTRAVENOUS at 20:58

## 2020-01-01 RX ADMIN — Medication 25 MILLIGRAM(S): at 05:40

## 2020-01-01 RX ADMIN — Medication 100 MILLIGRAM(S): at 05:17

## 2020-01-01 RX ADMIN — PIPERACILLIN AND TAZOBACTAM 25 GRAM(S): 4; .5 INJECTION, POWDER, LYOPHILIZED, FOR SOLUTION INTRAVENOUS at 01:11

## 2020-01-01 RX ADMIN — SODIUM CHLORIDE 40 MILLILITER(S): 9 INJECTION, SOLUTION INTRAVENOUS at 13:01

## 2020-01-01 RX ADMIN — Medication 3 MILLILITER(S): at 20:41

## 2020-01-01 RX ADMIN — Medication 100 MILLIGRAM(S): at 05:09

## 2020-01-01 RX ADMIN — Medication 3 MILLILITER(S): at 21:01

## 2020-01-01 RX ADMIN — Medication 3 MILLILITER(S): at 20:25

## 2020-01-01 RX ADMIN — SERTRALINE 50 MILLIGRAM(S): 25 TABLET, FILM COATED ORAL at 12:35

## 2020-01-01 RX ADMIN — BOSENTAN 125 MILLIGRAM(S): 125 TABLET, FILM COATED ORAL at 17:45

## 2020-01-01 RX ADMIN — Medication 650 MILLIGRAM(S): at 18:03

## 2020-01-01 RX ADMIN — Medication 1 TABLET(S): at 19:14

## 2020-01-01 RX ADMIN — WARFARIN SODIUM 4 MILLIGRAM(S): 2.5 TABLET ORAL at 22:33

## 2020-01-01 RX ADMIN — Medication 300 MILLIGRAM(S): at 05:04

## 2020-01-01 RX ADMIN — SERTRALINE 50 MILLIGRAM(S): 25 TABLET, FILM COATED ORAL at 09:28

## 2020-01-01 RX ADMIN — PIPERACILLIN AND TAZOBACTAM 25 GRAM(S): 4; .5 INJECTION, POWDER, LYOPHILIZED, FOR SOLUTION INTRAVENOUS at 16:46

## 2020-01-01 RX ADMIN — Medication 3 MILLILITER(S): at 15:12

## 2020-01-01 RX ADMIN — ATORVASTATIN CALCIUM 10 MILLIGRAM(S): 80 TABLET, FILM COATED ORAL at 22:51

## 2020-01-01 RX ADMIN — Medication 3 MILLILITER(S): at 14:47

## 2020-01-01 RX ADMIN — Medication 3 MILLILITER(S): at 03:46

## 2020-01-01 RX ADMIN — AZITHROMYCIN 255 MILLIGRAM(S): 500 TABLET, FILM COATED ORAL at 17:45

## 2020-01-01 RX ADMIN — Medication 650 MILLIGRAM(S): at 10:00

## 2020-01-01 RX ADMIN — Medication 3 MILLILITER(S): at 08:37

## 2020-01-01 RX ADMIN — WARFARIN SODIUM 4 MILLIGRAM(S): 2.5 TABLET ORAL at 23:17

## 2020-01-01 RX ADMIN — NYSTATIN CREAM 1 APPLICATION(S): 100000 CREAM TOPICAL at 19:14

## 2020-01-01 RX ADMIN — Medication 100 MILLIGRAM(S): at 05:05

## 2020-01-01 RX ADMIN — AZITHROMYCIN 255 MILLIGRAM(S): 500 TABLET, FILM COATED ORAL at 14:13

## 2020-01-01 RX ADMIN — Medication 25 MILLIGRAM(S): at 06:24

## 2020-01-01 RX ADMIN — Medication 3 MILLILITER(S): at 03:51

## 2020-01-01 RX ADMIN — Medication 3 MILLILITER(S): at 14:38

## 2020-01-01 RX ADMIN — Medication 25 MILLIGRAM(S): at 06:28

## 2020-01-01 RX ADMIN — Medication 3 MILLILITER(S): at 04:08

## 2020-01-01 RX ADMIN — NYSTATIN CREAM 1 APPLICATION(S): 100000 CREAM TOPICAL at 05:05

## 2020-01-01 RX ADMIN — SODIUM CHLORIDE 2000 MILLILITER(S): 9 INJECTION INTRAMUSCULAR; INTRAVENOUS; SUBCUTANEOUS at 17:17

## 2020-01-01 RX ADMIN — NYSTATIN CREAM 1 APPLICATION(S): 100000 CREAM TOPICAL at 17:44

## 2020-01-01 RX ADMIN — Medication 650 MILLIGRAM(S): at 05:25

## 2020-01-01 RX ADMIN — Medication 650 MILLIGRAM(S): at 06:05

## 2020-01-01 RX ADMIN — Medication 3 MILLILITER(S): at 09:09

## 2020-01-01 RX ADMIN — Medication 650 MILLIGRAM(S): at 20:20

## 2020-01-01 RX ADMIN — Medication 1 TABLET(S): at 10:26

## 2020-01-01 RX ADMIN — Medication 3 MILLILITER(S): at 20:51

## 2020-01-01 RX ADMIN — PIPERACILLIN AND TAZOBACTAM 25 GRAM(S): 4; .5 INJECTION, POWDER, LYOPHILIZED, FOR SOLUTION INTRAVENOUS at 22:51

## 2020-01-01 RX ADMIN — Medication 3 MILLILITER(S): at 20:35

## 2020-01-01 RX ADMIN — BOSENTAN 125 MILLIGRAM(S): 125 TABLET, FILM COATED ORAL at 06:18

## 2020-01-01 RX ADMIN — NYSTATIN CREAM 1 APPLICATION(S): 100000 CREAM TOPICAL at 17:36

## 2020-01-01 RX ADMIN — Medication 40 MILLIGRAM(S): at 19:53

## 2020-01-01 RX ADMIN — Medication 100 MILLIGRAM(S): at 17:08

## 2020-01-01 RX ADMIN — BOSENTAN 125 MILLIGRAM(S): 125 TABLET, FILM COATED ORAL at 19:15

## 2020-01-01 RX ADMIN — Medication 1 TABLET(S): at 18:12

## 2020-01-01 RX ADMIN — NYSTATIN CREAM 1 APPLICATION(S): 100000 CREAM TOPICAL at 18:12

## 2020-01-01 RX ADMIN — PIPERACILLIN AND TAZOBACTAM 25 GRAM(S): 4; .5 INJECTION, POWDER, LYOPHILIZED, FOR SOLUTION INTRAVENOUS at 08:44

## 2020-01-01 RX ADMIN — SERTRALINE 50 MILLIGRAM(S): 25 TABLET, FILM COATED ORAL at 08:25

## 2020-01-01 RX ADMIN — PIPERACILLIN AND TAZOBACTAM 200 GRAM(S): 4; .5 INJECTION, POWDER, LYOPHILIZED, FOR SOLUTION INTRAVENOUS at 12:29

## 2020-01-01 RX ADMIN — Medication 1 TABLET(S): at 06:28

## 2020-01-01 RX ADMIN — Medication 650 MILLIGRAM(S): at 04:25

## 2020-01-13 NOTE — REASON FOR VISIT
[FreeTextEntry1] : Patient presents for re re assessment with regard to management of\par 1. Hypoxemia - apparently desaturating periodically. Usually with exertion , but sometimes at rest and assoc with slight change in sensorium\par 2. Volume overload - largely controlled with the current diuretic regimen\par 3. Pulmonary hypertension - severe and of seemingly mixed but uncertain etiology ( see earlier notes)\par \par Got an opinion from a PAH Doc, but we've received no communication and they don't recall name\par \par 8/8/19- daughter called with concerns of a 3 lb amada gain since changing Lasix to  QOD, this has been changed to 5 x weekly. Most recent CMP 9/20/19 K =4.3 Creat. 1.59 which is acceptable. \par \par Daughter also voices concerns of increased fatigue through out the day and and has noticed a decrease in her appetite.\par \par She is requiring oxygen at 2 L nasal cannula on a 24 7 basis, Spo2 avg 93 %, states breathing is "better"\par Denies any increased orthopnea, PND, edema,.\par No other new or intercurrent medical problems.

## 2020-01-13 NOTE — PHYSICAL EXAM
[Normal Conjunctiva] : the conjunctiva exhibited no abnormalities [Eyelids - No Xanthelasma] : the eyelids demonstrated no xanthelasmas [Normal Oral Mucosa] : normal oral mucosa [No Oral Pallor] : no oral pallor [Normal Jugular Venous A Waves Present] : normal jugular venous A waves present [No Oral Cyanosis] : no oral cyanosis [Normal Jugular Venous V Waves Present] : normal jugular venous V waves present [No Jugular Venous Mast A Waves] : no jugular venous mast A waves [Respiration, Rhythm And Depth] : normal respiratory rhythm and effort [Exaggerated Use Of Accessory Muscles For Inspiration] : no accessory muscle use [Auscultation Breath Sounds / Voice Sounds] : lungs were clear to auscultation bilaterally [Abdomen Soft] : soft [Abdomen Tenderness] : non-tender [Abdomen Mass (___ Cm)] : no abdominal mass palpated [Nail Clubbing] : no clubbing of the fingernails [Petechial Hemorrhages (___cm)] : no petechial hemorrhages [Cyanosis, Localized] : no localized cyanosis [Skin Color & Pigmentation] : normal skin color and pigmentation [] : no rash [No Venous Stasis] : no venous stasis [Skin Lesions] : no skin lesions [No Skin Ulcers] : no skin ulcer [No Xanthoma] : no  xanthoma was observed [Oriented To Time, Place, And Person] : oriented to person, place, and time [Affect] : the affect was normal [Mood] : the mood was normal [No Anxiety] : not feeling anxious [FreeTextEntry1] : Cardiac:\par Nl S1 S2 with a grade 2/6  SHANEL and no significant  gallops or rubs. No edema

## 2020-01-13 NOTE — REVIEW OF SYSTEMS
[Loss Of Hearing] : hearing loss [Shortness Of Breath] : shortness of breath [Dyspnea on exertion] : dyspnea during exertion [Joint Swelling] : joint swelling [Joint Pain] : joint pain [see HPI] : see HPI [Tremor] : a tremor was seen [Negative] : Heme/Lymph [Chest  Pressure] : no chest pressure [Lower Ext Edema] : no extremity edema

## 2020-01-13 NOTE — ASSESSMENT
[FreeTextEntry1] : ECG: Underlying atrial fibrillation with 100% ventricular pacing\par \par \par Laboratory data               3/28/19:         9/20/19 12/4/2019\par BUN                                        52                   55          64\par Creatinine                             1.65               1.59        1.79\par Potassium                               4.3                4.3          4.3\par LFTs are normal.                                                        LFTS WNL\par \par Pacemaker interrogation 10/23/19:\par Battery 1.5 years remained\par 100% ventricularly paced\par 6 runs of nonsustained ventricular tachycardia longest 8 beats at 168 beats per minute. All thresholds and impedances are normal.\par \par Echocardiogram 10/23/19\par Pulmonary systolic pressure 60 .mmHg\par Low-normal RV systolic function RV Vol and pressure overload\par Mild biatrial enlargement\par Moderate tricuspid insufficiency\par Bioprosthetic mitral valve well seated and functioning normally.\par Mild aortic valve stenosis.  22.3/ 39.2  mmHg Mean/peak gradients\par Comparison with an echocardiogram one year ago shows no significant interval change.\par \par Impression:\par 1. Clinically improved with aggressive diuretics, consistent with HFpEF and PAH.\par 2. Still remains oxygen dependent which suggests a component other than heart failure. Though evaluation by Dr hooper has not demonstrated any other obvious pulmonary process. Recent desaturations perhaps assoc with periodic changes in her sensorium\par 3. Creat. now increased to 1.79 on, Lasix now 5 x weekly.\par 4. Appropriate ventricular pacing. 100%\par 5. Moderately severe pulmonary artery hypertension unchanged from the last echocardiogram despite Trachleer\par 6. Concerns of increased fatigue  which we an attribute to fractured night time sleep from going to the     bathroom  due to diuretic use.\par \par Plan:\par 1. Will continue diuretic regimen metolazone 2.5 daily and lasix 40 5x weekly.\par \par 2. No changes in medication management at this time.\par \par 3. Repeat lab data monthly.\par \par 4.  PPM interrogation per schedule\par \par Clinical follow up in 4 months

## 2020-02-11 NOTE — CONSULT NOTE ADULT - ASSESSMENT
Assessment:  89 year old woman with past medical history of Severe pulmonary hypertension (mixed etiology) on home oxygen (2L), HFpEF,  Bioprosthetic mitral valve, COPD, permanent pacemaker, who was sent to the hospital due to low oxygen ~ 70s at home, likely due to severe pulmonary hypertension. Patient denies exertional angina and troponin is negative.     Recommendations:  [] Hypoxemia: Likely secondary to history of severe pulmonary hypertension. Discussed with outpatient cardiologist and no need for repeat echo this admission. Echo from October with LVEF 55-60% and right ventricular volume and pressure overload. Patient has mild volume overload, recommend Lasix 40 mg IVP tonight, and re-evaluate tomorrow. Check ECG. Continue home Bosentan and metoprolol.  [] Left ankle pain/erythemia workup per primary team    Thank you for the consult. We will follow along.    Skylar Barahona MD  Cardiology, [facility] Assessment:  89 year old woman with past medical history of Severe pulmonary hypertension (mixed etiology) on home oxygen (2L), HFpEF,  Bioprosthetic mitral valve, COPD, Atrial fibrillation (on coumadin), permanent pacemaker, who was sent to the hospital due to low oxygen ~ 70s at home, likely due to severe pulmonary hypertension. Patient denies exertional angina and troponin is negative.     Recommendations:  [] Hypoxemia: Likely secondary to history of severe pulmonary hypertension. Discussed with outpatient cardiologist and no need for repeat echo this admission. Echo from October with LVEF 55-60% and right ventricular volume and pressure overload. Patient has mild volume overload, recommend Lasix 40 mg IVP tonight, and re-evaluate tomorrow. Check ECG. Continue home Bosentan and metoprolol.  [] Atrial fibrillation: Check INR and can dose coumadin accordingly  [] Left ankle pain/erythemia workup per primary team    Thank you for the consult. We will follow along.    Skylar Barahona MD  Cardiology, [facility]

## 2020-02-11 NOTE — ED ADULT NURSE NOTE - NSIMPLEMENTINTERV_GEN_ALL_ED
Implemented All Fall with Harm Risk Interventions:  [place] to call system. Call bell, personal items and telephone within reach. Instruct patient to call for assistance. Room bathroom lighting operational. Non-slip footwear when patient is off stretcher. Physically safe environment: no spills, clutter or unnecessary equipment. Stretcher in lowest position, wheels locked, appropriate side rails in place. Provide visual cue, wrist band, yellow gown, etc. Monitor gait and stability. Monitor for mental status changes and reorient to person, place, and time. Review medications for side effects contributing to fall risk. Reinforce activity limits and safety measures with patient and family. Provide visual clues: red socks.

## 2020-02-11 NOTE — CONSULT NOTE ADULT - SUBJECTIVE AND OBJECTIVE BOX
HARMAN MONTE  [id number]      HPI:  89 year old woman with past medical history of Severe pulmonary hypertension (mixed etiology) on home oxygen (2L), HFpEF,  Bioprosthetic mitral valve, COPD, permanent pacemaker, who was sent to the hospital due to symptoms of low oxygen ~ 70s at home. The patient is present with her daughter and states that she has not noticed increasing dyspnea or angina. Denies leg edema. Denies syncope. Recently saw cardiologist in January and did have increased Cr and increased fatigue.       ALLERGIES:  Allergy Status Unknown      PAST MEDICAL & SURGICAL HISTORY:  Severe pulmonary hypertension (mixed etiology) on home oxygen (2L)  HFpEF  Bioprosthetic mitral valve  COPD  Permanent pacemaker    Otherwise, as noted above    MEDICATIONS (HOME):      SOCIAL HISTORY:  Patient denies alcohol, tobacco, drug use    FAMILY HISTORY:      ROS:  Patient denies cough, and other than noted above full ROS is unremarkable      PHYSICAL EXAM:  Vital Signs Last 24 Hrs  T(C): 36.6 (11 Feb 2020 15:52), Max: 36.6 (11 Feb 2020 15:52)  T(F): 97.8 (11 Feb 2020 15:52), Max: 97.8 (11 Feb 2020 15:52)  HR: 71 (11 Feb 2020 15:52) (70 - 71)  BP: 104/64 (11 Feb 2020 15:52) (104/64 - 116/71)  BP(mean): --  RR: 16 (11 Feb 2020 15:52) (16 - 16)  SpO2: 97% (11 Feb 2020 15:52) (97% - 99%)  General: Patient comfortable in NAD  HEENT: NCAT, mmm, EOMI  Neck: no JVD, no carotid bruits  CVS: nl s1, split s2, no s3, no s4, no murmur or rubs, RRR  Chest: CTA b/l  Abdomen: soft, nt/nd  Extremities: No c/c/e  Neuro: A&O x3  Psych: Normal affect      ECG:      LABS:                        9.1    6.09  )-----------( 147      ( 11 Feb 2020 14:47 )             30.5     02-11    136  |  92<L>  |  53.0<H>  ----------------------------<  112<H>  4.4   |  35.0<H>  |  1.46<H>    Ca    9.0      11 Feb 2020 14:47    TPro  7.4  /  Alb  4.0  /  TBili  0.3<L>  /  DBili  x   /  AST  18  /  ALT  7   /  AlkPhos  51  02-11              RADIOLOGY:        Assessment:  89yFemale with PMHx p/w    Plan:  1. HRAMAN MONTE  [id number]      HPI:  89 year old woman with past medical history of Severe pulmonary hypertension (mixed etiology) on home oxygen (2L), HFpEF,  Bioprosthetic mitral valve, COPD, permanent pacemaker, who was sent to the hospital due to low oxygen ~ 70s at home. The patient is present with her daughter and states that she has not noticed increasing dyspnea or angina. Denies leg edema. Denies syncope. Recently saw cardiologist in January and did have increased Creatinine and increased fatigue.       ALLERGIES:  Allergy Status Unknown      PAST MEDICAL & SURGICAL HISTORY:  Severe pulmonary hypertension (mixed etiology) on home oxygen (2L)  HFpEF  Bioprosthetic mitral valve  COPD  Permanent pacemaker    SOCIAL HISTORY:  Patient denies alcohol, tobacco, drug use      ROS:  All 10 systems reviewed and positives noted in HPI      Objective:     Vital Signs Last 24 Hrs  T(C): 36.6 (11 Feb 2020 15:52), Max: 36.6 (11 Feb 2020 15:52)  T(F): 97.8 (11 Feb 2020 15:52), Max: 97.8 (11 Feb 2020 15:52)  HR: 71 (11 Feb 2020 15:52) (70 - 71)  BP: 104/64 (11 Feb 2020 15:52) (104/64 - 116/71)  BP(mean): --  RR: 16 (11 Feb 2020 15:52) (16 - 16)  SpO2: 97% (11 Feb 2020 15:52) (97% - 99%)    Physical Exam:   General: elderly woman, sitting upright in bed   HEENT: NCAT, mmm, EOMI  Neck: supple  CVS: JVP ~ 9 cm H20, RRR, s1, s2, no murmurs  Chest: unlabored respirations, crackles   Abdomen: non-distended  Extremities: no lower extremity edema b/l   Neuro: A&O x3  Psych: Normal affect      LABS:                        9.1    6.09  )-----------( 147      ( 11 Feb 2020 14:47 )             30.5     02-11    136  |  92<L>  |  53.0<H>  ----------------------------<  112<H>  4.4   |  35.0<H>  |  1.46<H>    Ca    9.0      11 Feb 2020 14:47    TPro  7.4  /  Alb  4.0  /  TBili  0.3<L>  /  DBili  x   /  AST  18  /  ALT  7   /  AlkPhos  51  02-11        Outpatient TTE (10/2019):  LVEF 55-60%  Right ventricular volume and pressure overload  Bovine mitral valve prosthesis present, mitral prosthesis regurgitation  Moderate aortic valve stenosis HARMAN MONTE  [id number]      HPI:  89 year old woman with past medical history of Severe pulmonary hypertension (mixed etiology) on home oxygen (2L), HFpEF,  Bioprosthetic mitral valve, COPD, Atrial fibrillation (on coumadin), permanent pacemaker, who was sent to the hospital due to low oxygen ~ 70s at home. The patient is present with her daughter and states that she has not noticed increasing dyspnea or angina. Denies leg edema. Denies syncope. Recently saw cardiologist in January and did have increased Creatinine and increased fatigue.       ALLERGIES:  Allergy Status Unknown      PAST MEDICAL & SURGICAL HISTORY:  Severe pulmonary hypertension (mixed etiology) on home oxygen (2L)  HFpEF  Bioprosthetic mitral valve  COPD  Permanent pacemaker    SOCIAL HISTORY:  Patient denies alcohol, tobacco, drug use      ROS:  All 10 systems reviewed and positives noted in HPI      Objective:     Vital Signs Last 24 Hrs  T(C): 36.6 (11 Feb 2020 15:52), Max: 36.6 (11 Feb 2020 15:52)  T(F): 97.8 (11 Feb 2020 15:52), Max: 97.8 (11 Feb 2020 15:52)  HR: 71 (11 Feb 2020 15:52) (70 - 71)  BP: 104/64 (11 Feb 2020 15:52) (104/64 - 116/71)  BP(mean): --  RR: 16 (11 Feb 2020 15:52) (16 - 16)  SpO2: 97% (11 Feb 2020 15:52) (97% - 99%)    Physical Exam:   General: elderly woman, sitting upright in bed   HEENT: NCAT, mmm, EOMI  Neck: supple  CVS: JVP ~ 9 cm H20, RRR, s1, s2, no murmurs  Chest: unlabored respirations, crackles   Abdomen: non-distended  Extremities: no lower extremity edema b/l   Neuro: A&O x3  Psych: Normal affect      LABS:                        9.1    6.09  )-----------( 147      ( 11 Feb 2020 14:47 )             30.5     02-11    136  |  92<L>  |  53.0<H>  ----------------------------<  112<H>  4.4   |  35.0<H>  |  1.46<H>    Ca    9.0      11 Feb 2020 14:47    TPro  7.4  /  Alb  4.0  /  TBili  0.3<L>  /  DBili  x   /  AST  18  /  ALT  7   /  AlkPhos  51  02-11        Outpatient TTE (10/2019):  LVEF 55-60%  Right ventricular volume and pressure overload  Bovine mitral valve prosthesis present, mitral prosthesis regurgitation  Moderate aortic valve stenosis

## 2020-02-11 NOTE — ED ADULT NURSE REASSESSMENT NOTE - NSIMPLEMENTINTERV_GEN_ALL_ED
Implemented All Fall with Harm Risk Interventions:  [place] to call system. Call bell, personal items and telephone within reach. Instruct patient to call for assistance. Room bathroom lighting operational. Non-slip footwear when patient is off stretcher. Physically safe environment: no spills, clutter or unnecessary equipment. Stretcher in lowest position, wheels locked, appropriate side rails in place. Provide visual cue, wrist band, yellow gown, etc. Monitor gait and stability. Monitor for mental status changes and reorient to person, place, and time. Review medications for side effects contributing to fall risk. Reinforce activity limits and safety measures with patient and family. Provide visual clues: red socks.

## 2020-02-11 NOTE — ED ADULT NURSE NOTE - OBJECTIVE STATEMENT
pt came in with left foot redness and swelling. pain when touched is 10/10 but is a zero at rest.  she denied fever

## 2020-02-11 NOTE — ED ADULT TRIAGE NOTE - CHIEF COMPLAINT QUOTE
Left foot pain and swelling with redness x 2 months.  Daughter also notes that pt has been having full body twitches and b/l knee weakness when ambulating with multiple recent falls.  Denies injuries from falls.  On 2L continuous home O2.

## 2020-02-11 NOTE — H&P ADULT - HISTORY OF PRESENT ILLNESS
88 y/o female who is on home o2 , 2 L nc for his h/o severe pulmonary htn was brought in as pt's daughter noticed that for past 1 week pt's oxygenation runs low and she was in need of increase oxygen than her baseline. pt. has some cough, no fever. no sick contact. pt. walks with walker but denies exertional dyspnea, no cp. no syncopy. pt.sleeps with head for long time and that did not change. no abd. pain. no n /v/d. pt. has noticed some left foot area pain and swelling, some warmth for past few days. As per pt. she has been ambulatory and not sure how pain and swelling in left foot started, there has been no fall or trauma to left foot as per history. Pt's daughter called her cardiologist and she was instructed to go to the ER. 88 y/o female who is on home o2 , 2 L nc for his h/o severe pulmonary htn was brought in as pt's daughter noticed that for past 1 week pt's oxygenation runs low and she was in need of increase oxygen than her baseline. pt. has some cough, no fever. no sick contact. pt. walks with walker but denies exertional dyspnea, no cp. no syncopy. pt.sleeps with head for long time and that did not change. no abd. pain. no n /v/d. pt. has noticed some left foot area pain and swelling, some warmth for past few days. As per pt. she has been ambulatory and not sure how pain and swelling in left foot started, there has been no fall or trauma to left foot as per history. Pt's daughter called her cardiologist and she was instructed to go to the ER.   pt. has past medical history of Severe pulmonary hypertension (mixed etiology) on home oxygen (2L), HFpEF,  Bioprosthetic mitral valve, COPD, Atrial fibrillation (on coumadin), permanent pacemaker. 89 y/o female who is on home o2 , 2 L nc for his h/o severe pulmonary htn was brought in as pt's daughter noticed that for past 1 week pt's oxygenation runs low 70's ? and she was in need of increase oxygen than her baseline. pt. has some cough, no fever. no sick contact. pt. walks with walker but denies exertional dyspnea, no cp. no syncopy. pt.sleeps with head for long time and that did not change. no abd. pain. no n /v/d. pt. has noticed some left foot area pain and swelling, some warmth for past few days. As per pt. she has been ambulatory and not sure how pain and swelling in left foot started, there has been no fall or trauma to left foot as per history. Pt's daughter called her cardiologist and she was instructed to go to the ER.   pt. has past medical history of Severe pulmonary hypertension (mixed etiology) on home oxygen (2L), HFpEF,  Bioprosthetic mitral valve, COPD ? Atrial fibrillation (on coumadin), permanent pacemaker.

## 2020-02-11 NOTE — ED PROVIDER NOTE - OBJECTIVE STATEMENT
88 yo female pmh pulmonary hypertension  home oxygen  2 liters NC comes to ed with multiple falls,  desaturation in 80" s with exertion; pt noted recent pain in left foot after fall; denies head , neck , chest or back pain

## 2020-02-11 NOTE — H&P ADULT - ASSESSMENT
pt. is admitted for :    - Hypoxemia, likely multifactorial, possible some volume over load, iv lasix as per cardiologist at this point and follow clinically. o2 suport, duoneb.      - Chronic atrial fibrillation, will continue with coumadin , dig and b. blk.     - Left foot pain, acute, with some warmth and mild erythema, cellulitis ? , will keep on clinda for now, elevation, warm compresses and follow clinically.    - Pulmonary htn, continue tracleer. no echo at this point as per cardiologist , last echo out-pt. 10/2019. pt. is admitted for :    - Hypoxemia, likely multifactorial, possible some volume over load, iv lasix as per cardiologist at this point and follow clinically. o2 suport, duoneb.      - Chronic atrial fibrillation, will continue with coumadin , dig and b. blk.     - Left foot pain, acute, with some warmth and mild erythema, cellulitis ? , will keep on clinda for now, elevation, warm compresses and follow clinically. will get ct scan of left foot / ankle r/o occult fracture.     - Pulmonary htn, continue tracleer. no echo at this point as per cardiologist , last echo out-pt. 10/2019. pt. is admitted for :    - Hypoxemia, likely multifactorial, possible some volume over load, iv lasix as per cardiologist at this point and follow clinically. o2 suport, duoneb.      - Chronic atrial fibrillation, will continue with coumadin , dig and b. blk.     - Left foot pain, acute, with some warmth and mild erythema, cellulitis ? , will keep on clinda for now, elevation, warm compresses and follow clinically. will get ct scan of left foot / ankle r/o occult fracture.     - Pulmonary htn, continue tracleer. no echo at this point as per cardiologist , last echo out-pt. 10/2019.    - Anemia, will send anemia work up, no old labs in the system for comparison, Hb 9.1.

## 2020-02-11 NOTE — H&P ADULT - NSHPLABSRESULTS_GEN_ALL_CORE
Outpatient TTE (10/2019):  LVEF 55-60%  Right ventricular volume and pressure overload  Bovine mitral valve prosthesis present, mitral prosthesis regurgitation  Moderate aortic valve stenosis Outpatient TTE (10/2019):  LVEF 55-60%  Right ventricular volume and pressure overload  Bovine mitral valve prosthesis present, mitral prosthesis regurgitation  Moderate aortic valve stenosis.     ekg vpaced 70.  cxr possible mild congestion. reviewed by me , radiologist read pending.

## 2020-02-11 NOTE — ED ADULT NURSE REASSESSMENT NOTE - NS ED NURSE REASSESS COMMENT FT1
Pt is sleeping in stretcher comfortably at this time, no apparent distress noted. Pt respirations are spontaneous even and unlabored, O2 saturation remains within normal limits on 2LNC. Pt with no complaints at this time, awaiting transport. Pt safety maintained, stretcher wheels locked and stretcher in lowest position. RN continues to update pt on plan of care. Pt expresses understanding to RN. RN will continue to update pt regarding plan of care throughout ED stay.
Assumed pt care from RN GL, charting as noted. Pt A+Ox4, comfortable and cooperative, no complaints at this time. Pt assisted to bathroom via wheelchair, pt able to take small steps with assistance, gait noted to be unsteady, difficulty ambulating. Pt with redness/swelling noted to lateral left foot. Pt states pain is tolerable, hurts worse when she tries to walk. Pt assisted back into stretcher without any issue. Primafit external female catheter applied d/t difficulty ambulating. Pt safety maintained, stretcher in lowest position and wheels locked, call bell within reach and use reinforced by RN. Pt remains updated on plan of care and able to successfully teach back plan of care to RN. RN will continue to update pt throughout ED stay.

## 2020-02-11 NOTE — ED PROVIDER NOTE - CARE PLAN
Principal Discharge DX:	Pulmonary hypertension  Secondary Diagnosis:	Cellulitis of left lower extremity

## 2020-02-11 NOTE — H&P ADULT - NSHPPHYSICALEXAM_GEN_ALL_CORE
General: An elderly  female sitting up in bed  not in distress.  HEENT: AT, NC. PERRL. intact EOM. no throat erythema or exudate.   Neck: supple. mild JVD.   Chest: basal crackles bilaterally, no wheeze appreciated.  Heart: S1,S2. RRR. + systolic heart murmur. ankle edema left more than right noted.  Abdomen: soft. non-tender. non-distended. + BS.   Ext: no calf tenderness. ROm at left ankle mildly diminished due to pain. swelling , warmth and mild erythema noted over dorsal aspect and around ankle, no open wound, rt. ankle / foot without sig. swelling, no pain.   Neuro: AAO x3. no focal weakness. no speech disorder. CNs intact.   Skin: no diaphoresis, no cyanosis, other left foot skin findings as in ext. exam.   psych : pleasant, no agitation. mood ok.

## 2020-02-12 NOTE — PHYSICAL THERAPY INITIAL EVALUATION ADULT - ADDITIONAL COMMENTS
Pt. lives in a house with her daughter who works during the day. Pt. has 3 JIM with one rail and no stairs inside to negotiate. Pt. was modified independent PTA with a rollator and does not own any other DME.

## 2020-02-12 NOTE — CONSULT NOTE ADULT - SUBJECTIVE AND OBJECTIVE BOX
Patient is a 89y old  Female who presents with a chief complaint of low oxygenation (12 Feb 2020 14:17)      HPI:  89 y/o female who is on home o2 , 2 L nc for his h/o severe pulmonary htn was brought in as pt's daughter noticed that for past 1 week pt's oxygenation runs low 70's ? and she was in need of increase oxygen than her baseline. pt. has some cough, no fever. no sick contact. pt. walks with walker but denies exertional dyspnea, no cp. no syncopy. pt.sleeps with head for long time and that did not change. no abd. pain. no n /v/d. pt. has noticed some left foot area pain and swelling, some warmth for past few days. As per pt. she has been ambulatory and not sure how pain and swelling in left foot started, there has been no fall or trauma to left foot as per history. Pt's daughter called her cardiologist and she was instructed to go to the ER.   pt. has past medical history of Severe pulmonary hypertension (mixed etiology) on home oxygen (2L), HFpEF,  Bioprosthetic mitral valve, COPD ? Atrial fibrillation (on coumadin), permanent pacemaker. (11 Feb 2020 21:35)    Podiatry consulted for Left foot pain. Pt states she fell down last week but unsure if she hit her Left foot. Has been having Left foot pain for the past couple of days.       PAST MEDICAL & SURGICAL HISTORY:  History of COPD  H/O pulmonary hypertension  Atrial fibrillation  H/O prosthetic mitral valve: bioprosthetic.  Pacemaker    Allergies: Allergy Status Unknown    Medications: albuterol/ipratropium for Nebulization 3 milliLiter(s) Nebulizer every 6 hours  atorvastatin 10 milliGRAM(s) Oral at bedtime  bosentan 125 milliGRAM(s) Oral two times a day  digoxin     Tablet 0.125 milliGRAM(s) Oral daily  furosemide   Injectable 40 milliGRAM(s) IV Push daily  lactobacillus acidophilus 1 Tablet(s) Oral two times a day with meals  metoprolol tartrate 25 milliGRAM(s) Oral two times a day  nystatin Powder 1 Application(s) Topical two times a day  pregabalin 100 milliGRAM(s) Oral two times a day  sertraline 50 milliGRAM(s) Oral daily  warfarin 4 milliGRAM(s) Oral daily    FH:FAMILY HISTORY:  FH: heart disease: mother    SH:     Vital Signs Last 24 Hrs  T(C): 36.7 (12 Feb 2020 05:02), Max: 36.7 (12 Feb 2020 05:02)  T(F): 98 (12 Feb 2020 05:02), Max: 98 (12 Feb 2020 05:02)  HR: 70 (12 Feb 2020 14:39) (70 - 78)  BP: 102/62 (12 Feb 2020 05:02) (102/62 - 118/54)  BP(mean): --  RR: 16 (12 Feb 2020 05:02) (16 - 18)  SpO2: 98% (12 Feb 2020 14:39) (95% - 100%)    LABS                        8.3    5.04  )-----------( 136      ( 12 Feb 2020 06:54 )             26.6               02-12    136  |  90<L>  |  55.0<H>  ----------------------------<  98  4.0   |  34.0<H>  |  1.44<H>    Ca    8.7      12 Feb 2020 06:54  Mg     2.7     02-12    TPro  7.4  /  Alb  4.0  /  TBili  0.3<L>  /  DBili  x   /  AST  18  /  ALT  7   /  AlkPhos  51  02-11      ROS  REVIEW OF SYSTEMS:    CONSTITUTIONAL: No fever, weight loss, or fatigue  EYES: No eye pain, visual disturbances, or discharge  ENMT:  No difficulty hearing, tinnitus, vertigo; No sinus or throat pain  NECK: No pain or stiffness  BREASTS: No pain, masses, or nipple discharge  RESPIRATORY: No cough, wheezing, chills or hemoptysis; No shortness of breath  CARDIOVASCULAR: No chest pain, palpitations, dizziness, or leg swelling  GASTROINTESTINAL: No abdominal or epigastric pain. No nausea, vomiting, or hematemesis; No diarrhea or constipation. No melena or hematochezia.  GENITOURINARY: No dysuria, frequency, hematuria, or incontinence  NEUROLOGICAL: No headaches, memory loss, loss of strength, numbness, or tremors  SKIN: No itching, burning, rashes, or lesions   LYMPH NODES: No enlarged glands  ENDOCRINE: No heat or cold intolerance; No hair loss  MUSCULOSKELETAL: Left foot pain  PSYCHIATRIC: No depression, anxiety, mood swings, or difficulty sleeping  HEME/LYMPH: No easy bruising, or bleeding gums  ALLERY AND IMMUNOLOGIC: No hives or eczema      PHYSICAL EXAM  GEN: HARMAN MONTE is a pleasant well-nourished, well developed 89y Female in no acute distress, alert awake, and oriented to person, place and time.   LE Focused:    Vasc:  DP/PT pulses lightly palpable, cap refill <5 secs  Derm: No interdigital macerations or open lesions noted, erythema noted to Left 5th MPJ area  Neuro: Light touch sensation grossly intact  MSK: Pain on palpation to 5th metatarsal head area, pain on palpation to Left lateral malleolus, able to wiggle toes without pain    Imaging:   < from: Xray Foot AP + Lateral + Oblique, Left (02.11.20 @ 16:14) >   EXAM:  FOOT-LEFT                          PROCEDURE DATE:  02/11/2020          INTERPRETATION:  Left foot. 3 views. Patient has local swelling.    Arterial calcifications are noted.    There is a wire suture along the superior surface of the firstmetatarsal.    There is fairly significant first MTP degeneration with outward angulation at the first MTP joint. This appears chronic.    No bone destruction or fracture is evident.    IMPRESSION: As above.    < end of copied text >      < from: CT Foot No Cont, Left (02.12.20 @ 11:33) >   EXAM:  CT FOOT ONLY LT                         EXAM:  CT ANKLE ONLY LT                          PROCEDURE DATE:  02/12/2020          INTERPRETATION:  HISTORY: Left foot and ankle pain. Swelling.    Helical CT imaging of the left foot and ankle wasperformed without intravenous contrast. Sagittal and coronal reformats were provided. 3-D reformats were performed on a separate workstation.    Correlation is made with radiographs from February 11, 2020.    Findings:    LEFT ANKLE: There is no evidence of acute fracture or dislocation. There is well-corticated ossific density at the anterior aspect of the lateral malleolus consistent with remote avulsive injury. There is moderate talonavicular joint arthrosis with a chronic dorsal capsular avulsion. The subtalar and tibiotalar joint spaces are intact. There is a plantar calcaneal spur.     There is atherosclerotic disease. There is no fatty atrophy of the musculature.    LEFT FOOT: There is an acute fracture at the head neck junction of thefifth metatarsal with mild dorsal displacement acute capsular avulsions are also seen along the plantar aspect of the fifth metatarsal phalangeal joint along the plantar base of the fifth proximal phalanx. No additional fractures are demonstrated.    There is severe first metatarsophalangeal and hallux sesamoid joint arthrosis. There is medial subluxation of the first toe and the sesamoid bones relative to the first metatarsal. There is a cerclage wire within the proximal shaft of the first metatarsal. There is severe arthrosis at the second and third tarsometatarsal articulations.    IMPRESSION:    LEFT ANKLE: No evidence of acute fracture or dislocation.    LEFT FOOT: Acute fracture the head neck junction of the fifth metatarsal. Acute capsular avulsion along the plantar base of the fifth proximal phalanx of the metatarsal phalangeal joint.      < end of copied text >    A: Left 5th metatarsal fracture    P:  Patient evaluated, chart reviewed  Xrays reviewed  CT reviewed  Posterior splint applied, to be kept clean, dry and intact  Pt to be nonWB to Left foot   Recommend elevation to LLE while in bed  Pt stable for discharge from podiatry standpoint. Pt to follow up with Dr. Sullivan/Jamilah upon discharge  Discussed with Dr. Sullivan

## 2020-02-12 NOTE — PHYSICAL THERAPY INITIAL EVALUATION ADULT - DIAGNOSIS, PT EVAL
decreased functional mobility CAD (coronary artery disease)    Dyslipidemia    HTN (hypertension)    Parkinson disease    Peripheral vascular disease

## 2020-02-12 NOTE — PHYSICAL THERAPY INITIAL EVALUATION ADULT - LEVEL OF INDEPENDENCE: GAIT, REHAB EVAL
attempted with RW and assist, however pt. unable to maintain NWB left LE and assisted to sitting/unable to perform

## 2020-02-12 NOTE — PHYSICAL THERAPY INITIAL EVALUATION ADULT - MANUAL MUSCLE TESTING RESULTS, REHAB EVAL
Ues 3+/5 with exception to b/l shoulder flexion 2/5, right LE 4/5, left LE 3+/5 with exception to ankle N/A due to ruling out occult fx of ankle and foot- at least 3/5

## 2020-02-12 NOTE — PROGRESS NOTE ADULT - SUBJECTIVE AND OBJECTIVE BOX
HEALTH ISSUES - PROBLEM Dx:    left foot edema and pain, acute on chr hypoxia    INTERVAL HPI/ OVERNIGHT EVENTS:    no dyspnea  hypoxia improved to baseline   c/o left ankle pain    REVIEW OF SYSTEMS:    as above    Vital Signs Last 24 Hrs  T(C): 36.7 (12 Feb 2020 05:02), Max: 36.7 (12 Feb 2020 05:02)  T(F): 98 (12 Feb 2020 05:02), Max: 98 (12 Feb 2020 05:02)  HR: 70 (12 Feb 2020 08:44) (70 - 78)  BP: 102/62 (12 Feb 2020 05:02) (102/62 - 118/54)  BP(mean): --  RR: 16 (12 Feb 2020 05:02) (16 - 18)  SpO2: 97% (12 Feb 2020 08:44) (95% - 100%)    PHYSICAL EXAM-  General: An elderly  female sitting up in bed  not in distress.  HEENT: AT, NC. PERRL. intact EOM. no throat erythema or exudate.   Chest: few scattered basal crackles bilaterally, no wheeze appreciated.  Heart: S1,S2. RRR. + systolic heart murmur. ankle edema left more than right noted.  Abdomen: soft. non-tender. non-distended. + BS.   Ext: no calf tenderness. ROM at left ankle mildly diminished due to pain. edema , warmth and mild erythema noted over dorsal aspect and around ankle, no open wound, rt. ankle / foot without sig. swelling, no pain.   Neuro: AAO x3. no focal weakness. no speech disorder. CN intact.   Skin: no diaphoresis, no cyanosis, other left foot skin findings as in ext. exam.   psych : pleasant, no agitation. mood ok    MEDICATIONS  (STANDING):  albuterol/ipratropium for Nebulization 3 milliLiter(s) Nebulizer every 6 hours  atorvastatin 10 milliGRAM(s) Oral at bedtime  bosentan 125 milliGRAM(s) Oral two times a day  clindamycin   Capsule 300 milliGRAM(s) Oral every 8 hours  digoxin     Tablet 0.125 milliGRAM(s) Oral daily  furosemide   Injectable 40 milliGRAM(s) IV Push daily  lactobacillus acidophilus 1 Tablet(s) Oral two times a day with meals  metoprolol tartrate 25 milliGRAM(s) Oral two times a day  nystatin Powder 1 Application(s) Topical two times a day  pregabalin 100 milliGRAM(s) Oral two times a day  sertraline 50 milliGRAM(s) Oral daily  warfarin 4 milliGRAM(s) Oral daily    MEDICATIONS  (PRN):      LABS:                        8.3    5.04  )-----------( 136      ( 12 Feb 2020 06:54 )             26.6     02-12    136  |  90<L>  |  55.0<H>  ----------------------------<  98  4.0   |  34.0<H>  |  1.44<H>    Ca    8.7      12 Feb 2020 06:54  Mg     2.7     02-12    TPro  7.4  /  Alb  4.0  /  TBili  0.3<L>  /  DBili  x   /  AST  18  /  ALT  7   /  AlkPhos  51  02-11    PT/INR - ( 12 Feb 2020 06:54 )   PT: 24.5 sec;   INR: 2.12 ratio         PTT - ( 11 Feb 2020 21:15 )  PTT:50.6 sec

## 2020-02-12 NOTE — PROGRESS NOTE ADULT - SUBJECTIVE AND OBJECTIVE BOX
HARMAN MONTE  [id number]      Chief Complaint: Follow up hypoxemia/volume overload      Interval History: The patient reports less dyspnea today.       Tele: no events      Current meds:   albuterol/ipratropium for Nebulization 3 milliLiter(s) Nebulizer every 6 hours  atorvastatin 10 milliGRAM(s) Oral at bedtime  bosentan 125 milliGRAM(s) Oral two times a day  clindamycin   Capsule 300 milliGRAM(s) Oral every 8 hours  digoxin     Tablet 0.125 milliGRAM(s) Oral daily  furosemide   Injectable 40 milliGRAM(s) IV Push daily  lactobacillus acidophilus 1 Tablet(s) Oral two times a day with meals  metoprolol tartrate 25 milliGRAM(s) Oral two times a day  nystatin Powder 1 Application(s) Topical two times a day  pregabalin 100 milliGRAM(s) Oral two times a day  sertraline 50 milliGRAM(s) Oral daily  warfarin 4 milliGRAM(s) Oral daily      Objective:     Vital Signs:   T(C): 36.7 (02-12-20 @ 05:02), Max: 36.7 (02-12-20 @ 05:02)  HR: 70 (02-12-20 @ 08:44) (70 - 78)  BP: 102/62 (02-12-20 @ 05:02) (102/62 - 118/54)  RR: 16 (02-12-20 @ 05:02) (16 - 18)  SpO2: 97% (02-12-20 @ 08:44) (95% - 100%)  Wt(kg): --    Physical Exam:   General: elderly woman, sitting upright in bed   HEENT: NCAT, mmm, EOMI  Neck: supple  CVS: JVP ~ 9 cm H20, RRR, s1, s2, no murmurs  Chest: unlabored respirations, crackles   Abdomen: non-distended  Extremities: no lower extremity edema b/l   Neuro: A&O x3  Psych: Normal affect      Labs:   12 Feb 2020 06:54    136    |  90     |  55.0   ----------------------------<  98     4.0     |  34.0   |  1.44     Ca    8.7        12 Feb 2020 06:54  Mg     2.7       12 Feb 2020 06:54    TPro  7.4    /  Alb  4.0    /  TBili  0.3    /  DBili  x      /  AST  18     /  ALT  7      /  AlkPhos  51     11 Feb 2020 14:47                          8.3    5.04  )-----------( 136      ( 12 Feb 2020 06:54 )             26.6     PT/INR - ( 12 Feb 2020 06:54 )   PT: 24.5 sec;   INR: 2.12 ratio         PTT - ( 11 Feb 2020 21:15 )  PTT:50.6 sec  CARDIAC MARKERS ( 11 Feb 2020 18:56 )  x     / 0.02 ng/mL / x     / x     / x          Outpatient TTE (10/2019):  LVEF 55-60%  Right ventricular volume and pressure overload  Bovine mitral valve prosthesis present, mitral prosthesis regurgitation  Moderate aortic valve stenosis     ECG (2/11/2020): ventricular paced

## 2020-02-12 NOTE — PROGRESS NOTE ADULT - ASSESSMENT
Assessment:  89 year old woman with past medical history of Severe pulmonary hypertension (mixed etiology) on home oxygen (2L), HFpEF,  Bioprosthetic mitral valve, COPD, Atrial fibrillation (on coumadin), permanent pacemaker, who was sent to the hospital due to low oxygen ~ 70s at home, likely due to severe pulmonary hypertension. Patient denies exertional angina and troponin is negative.     Recommendations:  [] Hypoxemia: Likely secondary to history of severe pulmonary hypertension. Discussed with outpatient cardiologist and no need for repeat echo this admission. Echo from October with LVEF 55-60% and right ventricular volume and pressure overload. Patient has mild volume overload, continue Lasix 40 mg IVP daily. Continue home Bosentan and metoprolol.  [] Atrial fibrillation: Follow up INR and can dose coumadin accordingly  [] Left ankle pain/erythemia workup per primary team    Thank you for the consult. We will follow along.    Skylar Barahona MD  Cardiology, [facility]

## 2020-02-12 NOTE — PROGRESS NOTE ADULT - ASSESSMENT
88 y/o female who is on home o2 , 2 L nc for his h/o severe pulmonary htn      - acute on chronic hypoxic respiratory failure   likely multifactorial, possible some volume over load, iv lasix as per cardiologist at this point and follow clinically. O2 suport, duoneb.     improved and at baseline now   no need for rpt ECHO- prior ECHO noted    - Chronic atrial fibrillation, rate controlled, with PPM   will continue with coumadin , dig and b. blk. INR therapeutic    - Left foot pain, acute,    Xrays neg for acute finding   CT- 5th metatarsal head # with avulsion   Podiatry consulted for further management   pain controlled    - Pulmonary htn,    Continue Bosanten    - Anemia of chr dz  stable  transfuse PRN    PT eval- to ANDIE

## 2020-02-12 NOTE — PHYSICAL THERAPY INITIAL EVALUATION ADULT - ACTIVE RANGE OF MOTION EXAMINATION, REHAB EVAL
no Active ROM deficits were identified/with exception to b/l shoulder flexion right 0-30 degrees, left 0-20 degrees; pt. reports chronic limitation

## 2020-02-12 NOTE — PATIENT PROFILE ADULT - LIVES WITH
Thank you for your referral to the [facility] . We have contacted your patient to schedule an appointment and the patient has declined to schedule at this time. Please feel free to contact our clinic with any questions at [phone].     adult child(tony)

## 2020-02-13 NOTE — SEPSIS NOTE - ASSESSMENT
88 y/o female had code sepsis called. RRT present and Dr. Messina and Dr. Hassan present at bedside, Dr. Osullivan made aware and Dr. Cortes contacted. Full sepsis work up iniated. Pt placed on zosyn     CBC, CMP, Lactate, Procal, PT/INR Stat  Blood cx UA with UX stat  Tylenol 650mg Stat  NS bolus initiated-f/u lactate  XRay stat, shows no new infiltrates      Plan discussed with RRT, Dr. Osullivan, Dr. Cortes, RN and supporting staff.

## 2020-02-13 NOTE — SEPSIS NOTE - NSSUBJFT_GEN_A_CORE
88 y/o female had code sepsis called for rectal temp of 102.8F. Pt is a pt of Dr. Osullivan who was made aware of code. Full sepsis panel initiated.

## 2020-02-13 NOTE — PROGRESS NOTE ADULT - SUBJECTIVE AND OBJECTIVE BOX
HEALTH ISSUES - PROBLEM Dx:    left 5th metatarsal fracture, acute on chr hypoxia, pulm HTN    INTERVAL HPI/ OVERNIGHT EVENTS:    attempted PT today but couldn't  also noted hypoxia and pt states she is sleepy today  dgtr confirms that she is sleepy today    REVIEW OF SYSTEMS:    as above    Vital Signs Last 24 Hrs  T(C): 38 (13 Feb 2020 09:14), Max: 38 (13 Feb 2020 09:14)  T(F): 100.4 (13 Feb 2020 09:14), Max: 100.4 (13 Feb 2020 09:14)  HR: 70 (13 Feb 2020 10:46) (70 - 77)  BP: 100/52 (13 Feb 2020 10:46) (95/56 - 122/63)  BP(mean): --  RR: 18 (13 Feb 2020 09:14) (16 - 18)  SpO2: 98% (13 Feb 2020 09:14) (94% - 98%)    PHYSICAL EXAM-  General: An elderly  female sitting up in bed  not in distress.  HEENT: AT, NC. PERRL. intact EOM. no throat erythema or exudate.   Chest: few scattered basal crackles bilaterally, no wheeze appreciated.  Heart: S1,S2. RRR. + systolic heart murmur. ankle edema left more than right noted.  Abdomen: soft. non-tender. non-distended. + BS.   Ext: no calf tenderness. ROM at left ankle mildly diminished due to pain. edema , warmth and mild erythema noted over dorsal aspect and around ankle, no open wound, rt. ankle / foot without sig. swelling, no pain.   Neuro: AAO x3. no focal weakness. no speech disorder. CN intact.   Skin: no diaphoresis, no cyanosis, other left foot skin findings as in ext. exam.   psych : pleasant, no agitation. mood ok      MEDICATIONS  (STANDING):  albuterol/ipratropium for Nebulization 3 milliLiter(s) Nebulizer every 6 hours  atorvastatin 10 milliGRAM(s) Oral at bedtime  bosentan 125 milliGRAM(s) Oral two times a day  digoxin     Tablet 0.125 milliGRAM(s) Oral daily  lactobacillus acidophilus 1 Tablet(s) Oral two times a day with meals  metoprolol tartrate 25 milliGRAM(s) Oral two times a day  nystatin Powder 1 Application(s) Topical two times a day  pregabalin 100 milliGRAM(s) Oral two times a day  sertraline 50 milliGRAM(s) Oral daily  warfarin 4 milliGRAM(s) Oral daily    MEDICATIONS  (PRN):      LABS:                        8.3    5.04  )-----------( 136      ( 12 Feb 2020 06:54 )             26.6     02-12    136  |  90<L>  |  55.0<H>  ----------------------------<  98  4.0   |  34.0<H>  |  1.44<H>    Ca    8.7      12 Feb 2020 06:54  Mg     2.7     02-12    TPro  7.4  /  Alb  4.0  /  TBili  0.3<L>  /  DBili  x   /  AST  18  /  ALT  7   /  AlkPhos  51  02-11    PT/INR - ( 13 Feb 2020 06:32 )   PT: 24.4 sec;   INR: 2.11 ratio         PTT - ( 11 Feb 2020 21:15 )  PTT:50.6 sec

## 2020-02-13 NOTE — PROGRESS NOTE ADULT - ASSESSMENT
Assessment:  89 year old woman with past medical history of Severe pulmonary hypertension (mixed etiology) on home oxygen (2L), HFpEF,  Bioprosthetic mitral valve, COPD, Atrial fibrillation (on coumadin), permanent pacemaker, who was sent to the hospital due to low oxygen ~ 70s at home, likely due to severe pulmonary hypertension. Patient denies exertional angina and troponin is negative.     Recommendations:  [] Hypoxemia: Likely secondary to history of severe pulmonary hypertension. Discussed with outpatient cardiologist and no need for repeat echo this admission. Echo from October with LVEF 55-60% and right ventricular volume and pressure overload. Patient has mild volume overload, continue Lasix 40 mg IVP daily. Check BMP today. Continue home Bosentan and metoprolol.  [] Atrial fibrillation: Follow up INR and can dose coumadin accordingly  [] Left ankle pain/erythemia workup per primary team, podiatry consulted    Thank you for the consult. We will follow along.    Skylar Barahona MD  Cardiology, [facility]

## 2020-02-13 NOTE — PROGRESS NOTE ADULT - SUBJECTIVE AND OBJECTIVE BOX
Called by RN that patient is somnolent and hard to arouse.  Daughter by the bedside states that "mom is sleeping all day." Entered room and patient opened eyes, talking fluently and c/o right ankle pain. Was Code Sepsis earlier today.  On 50% Venti mask essentially all day. Denies HA, seizures, LOC, CP, palpitations, chills, fever, N/V or diaphoresis.   BP: 104/77  HR: 73  RR: 23  Afebrile and O2 sat 88-93% FS: 147  PHYSICAL EXAM-  General: Elderly female sitting up in bed, alert and no in distress  HEENT: EOMI, no throat erythema    Chest: few scattered basal crackles bilaterally, otherwise no wheeze noted  Heart: S1,S2. RRR. + II/VI systolic heart murmur at LSB, ankle edema left > right  Abdomen: soft, non-tender, non-distended   Ext: no calf tenderness. + left foot cast, ROM diminished, + edema, warmth and mild erythema dorsal aspect and around ankle on R, + pain to touch of R ankle   Neuro: A & O x3, reduced ROM in right ankle, speech fluent, sensory intact   Skin: warm, R foot tenderness, no skin breakage  A/P:  88yo F had code sepsis called earlier, sepsis work up in place. Started on Zosyn, hypoxia on Venturi mask, afebrile only c/o R ankle pain.  ABG stat, labs in AM, Tylenol for fever, Pulmonary PT  Xray of RLE Pen, PT Cx in AM,  for now, Lasix 40mg oral daily  case d/w Dr. Alvarez

## 2020-02-13 NOTE — PROGRESS NOTE ADULT - SUBJECTIVE AND OBJECTIVE BOX
Patient is a 89y old  Female who presents with a chief complaint of low oxygenation (12 Feb 2020 14:17)      HPI:  89 y/o female who is on home o2 , 2 L nc for his h/o severe pulmonary htn was brought in as pt's daughter noticed that for past 1 week pt's oxygenation runs low 70's ? and she was in need of increase oxygen than her baseline. pt. has some cough, no fever. no sick contact. pt. walks with walker but denies exertional dyspnea, no cp. no syncopy. pt.sleeps with head for long time and that did not change. no abd. pain. no n /v/d. pt. has noticed some left foot area pain and swelling, some warmth for past few days. As per pt. she has been ambulatory and not sure how pain and swelling in left foot started, there has been no fall or trauma to left foot as per history. Pt's daughter called her cardiologist and she was instructed to go to the ER.   pt. has past medical history of Severe pulmonary hypertension (mixed etiology) on home oxygen (2L), HFpEF,  Bioprosthetic mitral valve, COPD ? Atrial fibrillation (on coumadin), permanent pacemaker. (11 Feb 2020 21:35)    Podiatry consulted for Left foot pain. Pt states she fell down last week but unsure if she hit her Left foot. Left posterior splint clean, dry and intact.       PAST MEDICAL & SURGICAL HISTORY:  History of COPD  H/O pulmonary hypertension  Atrial fibrillation  H/O prosthetic mitral valve: bioprosthetic.  Pacemaker    Allergies: Allergy Status Unknown    Medications: albuterol/ipratropium for Nebulization 3 milliLiter(s) Nebulizer every 6 hours  atorvastatin 10 milliGRAM(s) Oral at bedtime  bosentan 125 milliGRAM(s) Oral two times a day  digoxin     Tablet 0.125 milliGRAM(s) Oral daily  furosemide   Injectable 40 milliGRAM(s) IV Push daily  lactobacillus acidophilus 1 Tablet(s) Oral two times a day with meals  metoprolol tartrate 25 milliGRAM(s) Oral two times a day  nystatin Powder 1 Application(s) Topical two times a day  pregabalin 100 milliGRAM(s) Oral two times a day  sertraline 50 milliGRAM(s) Oral daily  warfarin 4 milliGRAM(s) Oral daily    FH:FAMILY HISTORY:  FH: heart disease: mother    SH:     Vital Signs Last 24 Hrs  T(C): 38 (13 Feb 2020 09:14), Max: 38 (13 Feb 2020 09:14)  T(F): 100.4 (13 Feb 2020 09:14), Max: 100.4 (13 Feb 2020 09:14)  HR: 70 (13 Feb 2020 10:46) (70 - 77)  BP: 100/52 (13 Feb 2020 10:46) (95/56 - 122/63)  BP(mean): --  RR: 18 (13 Feb 2020 09:14) (16 - 18)  SpO2: 98% (13 Feb 2020 09:14) (94% - 98%)    LABS                        8.3    5.04  )-----------( 136      ( 12 Feb 2020 06:54 )             26.6   02-12    136  |  90<L>  |  55.0<H>  ----------------------------<  98  4.0   |  34.0<H>  |  1.44<H>    Ca    8.7      12 Feb 2020 06:54  Mg     2.7     02-12    TPro  7.4  /  Alb  4.0  /  TBili  0.3<L>  /  DBili  x   /  AST  18  /  ALT  7   /  AlkPhos  51  02-11      ROS  REVIEW OF SYSTEMS:    CONSTITUTIONAL: No fever, weight loss, or fatigue  EYES: No eye pain, visual disturbances, or discharge  ENMT:  No difficulty hearing, tinnitus, vertigo; No sinus or throat pain  NECK: No pain or stiffness  BREASTS: No pain, masses, or nipple discharge  RESPIRATORY: No cough, wheezing, chills or hemoptysis; No shortness of breath  CARDIOVASCULAR: No chest pain, palpitations, dizziness, or leg swelling  GASTROINTESTINAL: No abdominal or epigastric pain. No nausea, vomiting, or hematemesis; No diarrhea or constipation. No melena or hematochezia.  GENITOURINARY: No dysuria, frequency, hematuria, or incontinence  NEUROLOGICAL: No headaches, memory loss, loss of strength, numbness, or tremors  SKIN: No itching, burning, rashes, or lesions   LYMPH NODES: No enlarged glands  ENDOCRINE: No heat or cold intolerance; No hair loss  MUSCULOSKELETAL: Left foot pain  PSYCHIATRIC: No depression, anxiety, mood swings, or difficulty sleeping  HEME/LYMPH: No easy bruising, or bleeding gums  ALLERY AND IMMUNOLOGIC: No hives or eczema      PHYSICAL EXAM  GEN: HARMAN MONTE is a pleasant well-nourished, well developed 89y Female in no acute distress, alert awake, and oriented to person, place and time.   LE Focused:    Vasc:  DP/PT pulses lightly palpable, cap refill <5 secs  Derm: No interdigital macerations or open lesions noted, erythema noted to Left 5th MPJ area  Neuro: Light touch sensation grossly intact  MSK: Pain on palpation to 5th metatarsal head area, pain on palpation to Left lateral malleolus, able to wiggle toes without pain    Imaging:   < from: Xray Foot AP + Lateral + Oblique, Left (02.11.20 @ 16:14) >   EXAM:  FOOT-LEFT                          PROCEDURE DATE:  02/11/2020          INTERPRETATION:  Left foot. 3 views. Patient has local swelling.    Arterial calcifications are noted.    There is a wire suture along the superior surface of the firstmetatarsal.    There is fairly significant first MTP degeneration with outward angulation at the first MTP joint. This appears chronic.    No bone destruction or fracture is evident.    IMPRESSION: As above.    < end of copied text >      < from: CT Foot No Cont, Left (02.12.20 @ 11:33) >   EXAM:  CT FOOT ONLY LT                         EXAM:  CT ANKLE ONLY LT                          PROCEDURE DATE:  02/12/2020          INTERPRETATION:  HISTORY: Left foot and ankle pain. Swelling.    Helical CT imaging of the left foot and ankle wasperformed without intravenous contrast. Sagittal and coronal reformats were provided. 3-D reformats were performed on a separate workstation.    Correlation is made with radiographs from February 11, 2020.    Findings:    LEFT ANKLE: There is no evidence of acute fracture or dislocation. There is well-corticated ossific density at the anterior aspect of the lateral malleolus consistent with remote avulsive injury. There is moderate talonavicular joint arthrosis with a chronic dorsal capsular avulsion. The subtalar and tibiotalar joint spaces are intact. There is a plantar calcaneal spur.     There is atherosclerotic disease. There is no fatty atrophy of the musculature.    LEFT FOOT: There is an acute fracture at the head neck junction of thefifth metatarsal with mild dorsal displacement acute capsular avulsions are also seen along the plantar aspect of the fifth metatarsal phalangeal joint along the plantar base of the fifth proximal phalanx. No additional fractures are demonstrated.    There is severe first metatarsophalangeal and hallux sesamoid joint arthrosis. There is medial subluxation of the first toe and the sesamoid bones relative to the first metatarsal. There is a cerclage wire within the proximal shaft of the first metatarsal. There is severe arthrosis at the second and third tarsometatarsal articulations.    IMPRESSION:    LEFT ANKLE: No evidence of acute fracture or dislocation.    LEFT FOOT: Acute fracture the head neck junction of the fifth metatarsal. Acute capsular avulsion along the plantar base of the fifth proximal phalanx of the metatarsal phalangeal joint.      < end of copied text >    A: Left 5th metatarsal fracture    P:  Patient evaluated, chart reviewed  Xrays reviewed  CT reviewed  Posterior splint to be kept clean, dry and intact  Pt to be nonWB to Left foot   Recommend elevation to LLE while in bed. Pillow to be placed proximal to Left heel.   Pt stable for discharge from podiatry standpoint. Pt to follow up with Dr. Sullivan/Jamilah upon discharge  Discussed with Dr. Sullivan

## 2020-02-13 NOTE — PROGRESS NOTE ADULT - ASSESSMENT
88 y/o female who is on home o2 , 2 L nc for his h/o severe pulmonary htn    - lethargy, somnolence  likely sec to over diuresis  stop lasix  resume home dose from 2/15    - s/p acute on chronic hypoxic respiratory failure   likely multifactorial, possible some volume over load, iv lasix as per cardiologist at this point and follow clinically. O2 suport, duoneb.     improved and at baseline now   no need for rpt ECHO- prior ECHO noted    - Chronic atrial fibrillation, rate controlled, with PPM   will continue with coumadin , dig and b. blk. INR therapeutic    - 5th metatarsal head # with avulsion   Xrays neg for acute finding  Podiatry appreciated  left foot splint, leg elevation, NWB   pain controlled    - Pulmonary htn,    Continue Bosanten    - Anemia of chr dz  stable  transfuse PRN    PT eval- to ANDIE possibly tomorrow

## 2020-02-13 NOTE — PROGRESS NOTE ADULT - SUBJECTIVE AND OBJECTIVE BOX
HARMAN MONTE  [id number]      Chief Complaint: Follow up hypoxemia/volume overload      Interval History: The patient reports less dyspnea today. Denies chest discomfort.       Tele: no events        Current meds:   albuterol/ipratropium for Nebulization 3 milliLiter(s) Nebulizer every 6 hours  atorvastatin 10 milliGRAM(s) Oral at bedtime  bosentan 125 milliGRAM(s) Oral two times a day  digoxin     Tablet 0.125 milliGRAM(s) Oral daily  furosemide   Injectable 40 milliGRAM(s) IV Push daily  lactobacillus acidophilus 1 Tablet(s) Oral two times a day with meals  metoprolol tartrate 25 milliGRAM(s) Oral two times a day  nystatin Powder 1 Application(s) Topical two times a day  pregabalin 100 milliGRAM(s) Oral two times a day  sertraline 50 milliGRAM(s) Oral daily  warfarin 4 milliGRAM(s) Oral daily      Objective:     Vital Signs:   T(C): 38 (02-13-20 @ 09:14), Max: 38 (02-13-20 @ 09:14)  HR: 70 (02-13-20 @ 10:46) (70 - 77)  BP: 100/52 (02-13-20 @ 10:46) (95/56 - 122/63)  RR: 18 (02-13-20 @ 09:14) (16 - 18)  SpO2: 98% (02-13-20 @ 09:14) (94% - 98%)  Wt(kg): --    Physical Exam:   General: elderly woman, sitting upright in bed   HEENT: NCAT, mmm, EOMI  Neck: supple  CVS: JVP ~ 9 cm H20, RRR, s1, s2, no murmurs  Chest: unlabored respirations, crackles   Abdomen: non-distended  Extremities: no lower extremity edema b/l   Neuro: A&O x3  Psych: Normal affect    Labs:   12 Feb 2020 06:54    136    |  90     |  55.0   ----------------------------<  98     4.0     |  34.0   |  1.44     Ca    8.7        12 Feb 2020 06:54  Mg     2.7       12 Feb 2020 06:54    TPro  7.4    /  Alb  4.0    /  TBili  0.3    /  DBili  x      /  AST  18     /  ALT  7      /  AlkPhos  51     11 Feb 2020 14:47                          8.3    5.04  )-----------( 136      ( 12 Feb 2020 06:54 )             26.6     PT/INR - ( 13 Feb 2020 06:32 )   PT: 24.4 sec;   INR: 2.11 ratio         PTT - ( 11 Feb 2020 21:15 )  PTT:50.6 sec  CARDIAC MARKERS ( 11 Feb 2020 18:56 )  x     / 0.02 ng/mL / x     / x     / x            Outpatient TTE (10/2019):  LVEF 55-60%  Right ventricular volume and pressure overload  Bovine mitral valve prosthesis present, mitral prosthesis regurgitation  Moderate aortic valve stenosis     ECG (2/11/2020): ventricular paced

## 2020-02-14 NOTE — PROGRESS NOTE ADULT - SUBJECTIVE AND OBJECTIVE BOX
HEALTH ISSUES - PROBLEM Dx:    metatarsal #, code sepsis, FUO    INTERVAL HPI/ OVERNIGHT EVENTS:    had code sepsis yest.  all c/s sent  iv fluid bolus given  fever spike again today  ID consulted  ct non con ordered  will give maintenance meds  lasix stopped  poor Po intake sec to lethargy  d/w dgtr all of the above      REVIEW OF SYSTEMS:    as above    Vital Signs Last 24 Hrs  T(C): 40.1 (2020 11:59), Max: 40.1 (2020 11:59)  T(F): 104.1 (2020 11:59), Max: 104.1 (2020 11:59)  HR: 70 (2020 11:59) (69 - 81)  BP: 114/42 (2020 11:59) (84/58 - 129/55)  BP(mean): 66 (2020 17:27) (66 - 66)  RR: 20 (2020 11:59) (16 - 22)  SpO2: 93% (2020 09:31) (86% - 96%)    PHYSICAL EXAM-  General: An elderly  female sitting up in bed  not in distress.  HEENT: AT, NC. PERRL. intact EOM. no throat erythema or exudate.   Chest: few scattered basal crackles bilaterally, no wheeze appreciated.  Heart: S1,S2. RRR. + systolic heart murmur. ankle edema left more than right noted.  Abdomen: soft. non-tender. non-distended. + BS.   Ext: no calf tenderness. ROM at left ankle mildly diminished due to pain. edema , warmth and mild erythema noted over dorsal aspect and around ankle, no open wound, rt. ankle / foot without sig. swelling, no pain.   Neuro: AAO x3. no focal weakness. no speech disorder. CN intact.   Skin: no diaphoresis, no cyanosis, other left foot skin findings as in ext. exam.   psych : pleasant, no agitation. mood ok    MEDICATIONS  (STANDING):  acetaminophen  IVPB .. 1000 milliGRAM(s) IV Intermittent once  albuterol/ipratropium for Nebulization 3 milliLiter(s) Nebulizer every 6 hours  atorvastatin 10 milliGRAM(s) Oral at bedtime  bosentan 125 milliGRAM(s) Oral two times a day  digoxin     Tablet 0.125 milliGRAM(s) Oral daily  lactobacillus acidophilus 1 Tablet(s) Oral two times a day with meals  metoprolol tartrate 25 milliGRAM(s) Oral two times a day  nystatin Powder 1 Application(s) Topical two times a day  piperacillin/tazobactam IVPB. 3.375 Gram(s) IV Intermittent once  piperacillin/tazobactam IVPB.. 3.375 Gram(s) IV Intermittent every 8 hours  pregabalin 100 milliGRAM(s) Oral two times a day  sertraline 50 milliGRAM(s) Oral daily    MEDICATIONS  (PRN):  acetaminophen   Tablet .. 650 milliGRAM(s) Oral every 6 hours PRN Temp greater or equal to 38C (100.4F), Mild Pain (1 - 3)      LABS:                        8.3    7.98  )-----------( 140      ( 2020 17:43 )             26.5     02-14    136  |  92<L>  |  57.0<H>  ----------------------------<  121<H>  5.1   |  29.0  |  1.84<H>    Ca    8.7      2020 08:48    TPro  6.5<L>  /  Alb  3.4  /  TBili  0.3<L>  /  DBili  x   /  AST  11  /  ALT  <5  /  AlkPhos  42  02-13    PT/INR - ( 2020 08:51 )   PT: 48.9 sec;   INR: 4.14 ratio         PTT - ( 2020 17:42 )  PTT:48.9 sec  Urinalysis Basic - ( 2020 22:27 )    Color: Yellow / Appearance: Clear / S.020 / pH: x  Gluc: x / Ketone: Negative  / Bili: Negative / Urobili: Negative   Blood: x / Protein: 15 / Nitrite: Positive   Leuk Esterase: Trace / RBC: 0-2 /HPF / WBC 0-2   Sq Epi: x / Non Sq Epi: Few / Bacteria: Few

## 2020-02-14 NOTE — CONSULT NOTE ADULT - ASSESSMENT
Patient with likely worsened pulmonary hypertension.  Her etiologies are related to h/o mitral valve disease, afib, and currently with aortic stenosis which may have progressed.  There is a loud murmur c/w aortic stenosis.  Oxygenation status maintained on 50%.  Unfortunately there does not to be a defined acute event that can easily be remediated.  Her daughter who is at bedside engaged in conversation related to issues of advanced directives and apparently this has been discussed in the past and no aggressive interventions i.e. resuscitation with CPR or mechanical ventilators is to be initiated.  Patient has had a slow downward course over the last several months per patient's daughter with marked reduction in ability to perform activity such that she no longer is able to go outside the home.  I see no evidence of acute infection.  Procalcitonin is minimally elevated.         Plan:  1.Advance directives need to be put into place per family and patient known wishes  2.Agree with CT as may help determine need for more diuresis  3.Have d/c'd current NS with potassium order.  K+ is already at 5.1 and no further lasix is ordered.  Patient per daughter has been able to take sips of water. May need to however give some fluid pending further studies.   4.Titrate O2 as able  5.No indication to move to step down as aggressive interventions are not apparently desired by patient or family  6.Have asked palliative care to assist   7.Continue bronchodilators

## 2020-02-14 NOTE — CONSULT NOTE ADULT - PROBLEM SELECTOR RECOMMENDATION 7
RIP completed- DNR/I.  Hx of severe PH functionally debilitated. Home hospice services could be considered. However, patient on Tracleer which hospice may not cover cost of this medication. Would need to discuss with hospice.

## 2020-02-14 NOTE — GOALS OF CARE CONVERSATION - ADVANCED CARE PLANNING - CONVERSATION DETAILS
Met with daughter Atiya and granddaughter. They are aware of current issues  and treatment plan.   Atiya states mother has been functionally declining. She is on home O2 for 2 years and becomes SOB with slight activity. Thus, she needs assistance with ADLs, and is generally homebound.   Discussed advance directives  CPR, intubation/mechanical ventilation. Limited benefit given comorbid medical issues, baseline debility and advance age. Atiya states she knows her mother would not want such measurers  MOLST discussed and reviewed by Atiya. Agrees to DNR/I - signed by Atiya.

## 2020-02-14 NOTE — PROGRESS NOTE ADULT - SUBJECTIVE AND OBJECTIVE BOX
HARMAN MONTE  [id number]      Chief Complaint: Follow up hypoxemia/volume overload      Interval History: The patient appears fatigued and reports chills.       Tele: no events      Current meds:   acetaminophen   Tablet .. 650 milliGRAM(s) Oral every 6 hours PRN  acetaminophen  IVPB .. 1000 milliGRAM(s) IV Intermittent once  albuterol/ipratropium for Nebulization 3 milliLiter(s) Nebulizer every 6 hours  atorvastatin 10 milliGRAM(s) Oral at bedtime  bosentan 125 milliGRAM(s) Oral two times a day  digoxin     Tablet 0.125 milliGRAM(s) Oral daily  lactobacillus acidophilus 1 Tablet(s) Oral two times a day with meals  metoprolol tartrate 25 milliGRAM(s) Oral two times a day  nystatin Powder 1 Application(s) Topical two times a day  piperacillin/tazobactam IVPB. 3.375 Gram(s) IV Intermittent once  piperacillin/tazobactam IVPB.. 3.375 Gram(s) IV Intermittent every 8 hours  piperacillin/tazobactam IVPB.. 3.375 Gram(s) IV Intermittent every 8 hours  pregabalin 100 milliGRAM(s) Oral two times a day  sertraline 50 milliGRAM(s) Oral daily      Objective:     Vital Signs:   T(C): 40.1 (02-14-20 @ 11:59), Max: 40.1 (02-14-20 @ 11:59)  HR: 70 (02-14-20 @ 11:59) (69 - 81)  BP: 114/42 (02-14-20 @ 11:59) (84/58 - 129/55)  RR: 20 (02-14-20 @ 11:59) (16 - 22)  SpO2: 93% (02-14-20 @ 09:31) (86% - 96%)  Wt(kg): --      Physical Exam:   General: elderly woman, laying in bed, fatigued  HEENT: NCAT, mmm, EOMI  Neck: supple  CVS: JVP ~ 9 cm H20, RRR, s1, s2, no murmurs  Chest: unlabored respirations, mostly clear anteriorly  Abdomen: non-distended  Extremities: no lower extremity edema b/l, left ankle wrapped  Neuro: A&O x3  Psych: Normal affect      Labs:   14 Feb 2020 08:48    136    |  92     |  57.0   ----------------------------<  121    5.1     |  29.0   |  1.84     Ca    8.7        14 Feb 2020 08:48    TPro  6.5    /  Alb  3.4    /  TBili  0.3    /  DBili  x      /  AST  11     /  ALT  <5     /  AlkPhos  42     13 Feb 2020 17:42                          8.3    7.98  )-----------( 140      ( 13 Feb 2020 17:43 )             26.5     PT/INR - ( 14 Feb 2020 08:51 )   PT: 48.9 sec;   INR: 4.14 ratio         PTT - ( 13 Feb 2020 17:42 )  PTT:48.9 sec        Outpatient TTE (10/2019):  LVEF 55-60%  Right ventricular volume and pressure overload  Bovine mitral valve prosthesis present, mitral prosthesis regurgitation  Moderate aortic valve stenosis     ECG (2/11/2020): ventricular paced

## 2020-02-14 NOTE — PROGRESS NOTE ADULT - ASSESSMENT
88 y/o female who is on home o2 , 2 L nc for his h/o severe pulmonary htn    # Fever, code sepsis  had code sepsis yest  pan c/s sent. so far neg  bld and urine c/s testing  empiric zosyn started  though allergies lists PCN- d/w dgtr. pt gets hives with PCN  today fever of 104  iv tylenol x 1 dose ordered  ct non con ordered.   ID consulted  if above w/u neg will scan abd/ pelvis    # pt c/o rt foot pain too  foot xray done per podiatry  which shows ? metatarsal #  await podiatry f/u today    - elevated INR  sec to poor po intake  suspect vit k def sec to same  hold Coumadin today  if still increasing tomorrow consider reversal with vit K    - lethargy, somnolence  likely sec to over diuresis  stop lasix on 2/13    - s/p acute on chronic hypoxic respiratory failure   likely multifactorial, possible some volume over load, iv lasix as per cardiologist at this point and follow clinically. O2 suport, duoneb.     improved and at baseline now   no need for rpt ECHO- prior ECHO noted  now sec to fever. lethargy requiring Vm 35 % at baseline. for testing and such increase it to 50%    - Chronic atrial fibrillation, rate controlled, with PPM   will continue with coumadin , dig and b. blk.    - 5th metatarsal head # with avulsion on left  Xrays neg for acute finding  Podiatry appreciated  left foot splint, leg elevation, NWB  pain controlled    - Pulmonary htn,    Continue Bosanten    - Anemia of chr dz  stable  transfuse PRN    PT eval- she has place at [facility] but medically not clear 88 y/o female who is on home o2 , 2 L nc for his h/o severe pulmonary htn    # Fever, code sepsis  had code sepsis yest  pan c/s sent. so far neg  bld and urine c/s testing  empiric zosyn started  though allergies lists PCN- d/w dgtr. pt gets hives with PCN  today fever of 104  iv tylenol x 1 dose ordered  ct non con ordered.   ID consulted  if above w/u neg will scan abd/ pelvis    # acute metabolic encephaloapthy  sec to above  add head CT  upgrade tos tepdown    # pt c/o rt foot pain too  foot xray done per podiatry  which shows ? metatarsal #  await podiatry f/u today    - elevated INR  sec to poor po intake  suspect vit k def sec to same  hold Coumadin today  if still increasing tomorrow consider reversal with vit K    - lethargy, somnolence  likely sec to over diuresis  stop lasix on 2/13    - s/p acute on chronic hypoxic respiratory failure   likely multifactorial, possible some volume over load, iv lasix as per cardiologist at this point and follow clinically. O2 suport, duoneb.     improved and at baseline now   no need for rpt ECHO- prior ECHO noted  now sec to fever. lethargy requiring Vm 35 % at baseline. for testing and such increase it to 50%    - Chronic atrial fibrillation, rate controlled, with PPM   will continue with coumadin , dig and b. blk.    - 5th metatarsal head # with avulsion on left  Xrays neg for acute finding  Podiatry appreciated  left foot splint, leg elevation, NWB  pain controlled    - Pulmonary htn,    Continue Bosanten    - Anemia of chr dz  stable  transfuse PRN    PT eval- she has place at [facility] but medically not clear

## 2020-02-14 NOTE — CONSULT NOTE ADULT - SUBJECTIVE AND OBJECTIVE BOX
INFECTIOUS DISEASES AND INTERNAL MEDICINE at [facility]  =======================================================  Jermaine Craig MD  Diplomates American Board of Internal Medicine and Infectious Diseases  Telephone [phone]  Fax            [phone]  =======================================================    HARMAN [id number]      HPI:  88 y/o female who is on home o2 , 2 L nc for his h/o severe pulmonary htn was brought in as pt's daughter noticed that for past 1 week pt's oxygenation runs low 70's ? and she was in need of increase oxygen than her baseline. pt. has some cough, no fever. no sick contact. pt. walks with walker but denies exertional dyspnea, no cp. no syncopy.   no abd. pain. no n /v/d. pt. has noticed some left foot area pain and swelling, some warmth for past few days.PT WITH FALL AT HOME AS PER DAUGHTER AND HAD FOOT FRACTURE .    pt. has past medical history of Severe pulmonary hypertension (mixed etiology) on home oxygen (2L), HFpEF,  Bioprosthetic mitral valve, COPD ? Atrial fibrillation (on coumadin), permanent pacemaker.   FEVER 104  MORE LETHARGIC AS PER DAUGHTERS  ASKED TO EVALUATE FROM ID STANDPOINT       PAST MEDICAL & SURGICAL HISTORY:  History of COPD  H/O pulmonary hypertension  Atrial fibrillation  H/O prosthetic mitral valve: bioprosthetic.  Pacemaker      ANTIBIOTICS  piperacillin/tazobactam IVPB.. 3.375 Gram(s) IV Intermittent every 8 hours      Allergies    penicillin (Hives)    Intolerances        SOCIAL HISTORY:     FAMILY HX   FAMILY HISTORY:  FH: heart disease: mother      Vital Signs Last 24 Hrs  T(C): 40.1 (2020 11:59), Max: 40.1 (2020 11:59)  T(F): 104.1 (2020 11:59), Max: 104.1 (2020 11:59)  HR: 70 (2020 11:59) (69 - 81)  BP: 114/42 (2020 11:59) (84/58 - 129/55)  BP(mean): 66 (2020 17:27) (66 - 66)  RR: 20 (2020 11:59) (16 - 22)  SpO2: 93% (2020 09:31) (86% - 96%)  Drug Dosing Weight  Height (cm): 144.78 (2020 13:01)  Weight (kg): 62.6 (:)  BMI (kg/m2): 29.9 (:)  BSA (m2): 1.54 (:)      REVIEW OF SYSTEMS:  PT  LETHARGIC REVIEW OF SX FROM [facility]  CONSTITUTIONAL:  As per HPI.    HEENT:  Eyes:  No diplopia or blurred vision. ENT:  No earache, sore throat or runny nose.    CARDIOVASCULAR:  No pressure, squeezing, strangling, tightness, heaviness or aching about the chest, neck, axilla or epigastrium.    RESPIRATORY:  CHRONIC  shortness of breath,ON 02 AT HOME     GASTROINTESTINAL:  No nausea, vomiting or diarrhea.    GENITOURINARY:  No dysuria, frequency or urgency.    MUSCULOSKELETAL:  As per HPI.    SKIN:  No change in skin, hair or nails.    NEUROLOGIC:  No paresthesias, fasciculations, seizures or weakness.                  PHYSICAL EXAMINATION:    GENERAL: The patient is a well-developed, well-nourished __LETHARGIC ANSWERED QUESTIONS BUT THEN NODS OFF     VITAL SIGNS: T(C): 40.1 (20 @ 11:59), Max: 40.1 (20 @ 11:59)  HR: 70 (20 @ 11:59) (69 - 81)  BP: 114/42 (20 @ 11:59) (84/58 - 129/55)  RR: 20 (20 @ 11:59) (16 - 22)  SpO2: 93% (20 @ 09:31) (86% - 96%)  Wt(kg): --    HEENT: Head is normocephalic and atraumatic.  ANICTERIC  NECK: Supple. No carotid bruits.  No lymphadenopathy or thyromegaly.    LUNGS:COARSE BREATH SOUNDS    HEART: Regular rate and rhythm without murmur.    ABDOMEN: Soft, nontender, and nondistended.  Positive bowel sounds.  No hepatosplenomegaly was noted. NO REBOUND NO GUARDING    EXTREMITIES: NO EDEMA NO ERYTHEMA    NEUROLOGIC: LETHARGIC BUT  ANSWERS QUESTIONS      SKIN: No ulceration or induration present. NO RASH        BLOOD CULTURES  Culture Results:   No growth at 48 hours ( @ 23:12)  Culture Results:   No growth at 48 hours ( @ 14:49)  Culture Results:   No growth at 48 hours ( @ 14:48)       URINE CX          LABS:                        8.3    7.98  )-----------( 140      ( 2020 17:43 )             26.5     02-14    136  |  92<L>  |  57.0<H>  ----------------------------<  121<H>  5.1   |  29.0  |  1.84<H>    Ca    8.7      2020 08:48    TPro  6.5<L>  /  Alb  3.4  /  TBili  0.3<L>  /  DBili  x   /  AST  11  /  ALT  <5  /  AlkPhos  42  02-13    PT/INR - ( 2020 08:51 )   PT: 48.9 sec;   INR: 4.14 ratio         PTT - ( 2020 17:42 )  PTT:48.9 sec  Urinalysis Basic - ( 2020 22:27 )    Color: Yellow / Appearance: Clear / S.020 / pH: x  Gluc: x / Ketone: Negative  / Bili: Negative / Urobili: Negative   Blood: x / Protein: 15 / Nitrite: Positive   Leuk Esterase: Trace / RBC: 0-2 /HPF / WBC 0-2   Sq Epi: x / Non Sq Epi: Few / Bacteria: Few        RADIOLOGY & ADDITIONAL STUDIES:      ASSESSMENT/PLAN  88 y/o female who is on home o2 , 2 L nc for his h/o severe pulmonary htn was brought in as pt's daughter noticed that for past 1 week pt's oxygenation runs low 70's ? and she was in need of increase oxygen than her baseline. pt. has some cough, no fever. no sick contact. pt. walks with walker but denies exertional dyspnea, no cp. no syncopy.   no abd. pain. no n /v/d. pt. has noticed some left foot area pain and swelling, some warmth for past few days.PT WITH FALL AT HOME AS PER DAUGHTER AND HAD FOOT FRACTURE .    pt. has past medical history of Severe pulmonary hypertension (mixed etiology) on home oxygen (2L), HFpEF,  Bioprosthetic mitral valve, COPD ? Atrial fibrillation (on coumadin), permanent pacemaker.   FEVER 104  MORE LETHARGIC AS PER DAUGHTER  UNCLEAR SOURCE ? PNEUMONIA WILL RECOMMEND CT OF CHEST TO EVALUATE AS CXR OK  ALSO SUGGEST ABG NOW CHECK BLOODPH  WOULD DO CT HEAD AS PT WITH FALL AT HOME  TRANSFER TO MONITOR SETTING  PT ON ZOSYN  WILL ADD ONE DOSE OF VANCO UNTIL CX ARE BACK   WILL FOLLOW UP WITH FURTHER RECOMMENDATIONS   D/W DAUGHTER                BETTIE EVLAZQUEZ MD INFECTIOUS DISEASES AND INTERNAL MEDICINE at [facility]  =======================================================  Jermaine Craig MD  Diplomates American Board of Internal Medicine and Infectious Diseases  Telephone [phone]  Fax            [phone]  =======================================================    HARMAN [id number]      HPI:  88 y/o female who is on home o2 , 2 L nc for his h/o severe pulmonary htn was brought in as pt's daughter noticed that for past 1 week pt's oxygenation runs low 70's ? and she was in need of increase oxygen than her baseline. pt. has some cough, no fever. no sick contact. pt. walks with walker but denies exertional dyspnea, no cp. no syncopy.   no abd. pain. no n /v/d. pt. has noticed some left foot area pain and swelling, some warmth for past few days.PT WITH FALL AT HOME AS PER DAUGHTER AND HAD FOOT FRACTURE .    pt. has past medical history of Severe pulmonary hypertension (mixed etiology) on home oxygen (2L), HFpEF,  Bioprosthetic mitral valve, COPD ? Atrial fibrillation (on coumadin), permanent pacemaker.   FEVER 104  MORE LETHARGIC AS PER DAUGHTERS  ASKED TO EVALUATE FROM ID STANDPOINT       PAST MEDICAL & SURGICAL HISTORY:  History of COPD  H/O pulmonary hypertension  Atrial fibrillation  H/O prosthetic mitral valve: bioprosthetic.  Pacemaker      ANTIBIOTICS  piperacillin/tazobactam IVPB.. 3.375 Gram(s) IV Intermittent every 8 hours      Allergies    penicillin (Hives)    Intolerances        SOCIAL HISTORY:     FAMILY HX   FAMILY HISTORY:  FH: heart disease: mother      Vital Signs Last 24 Hrs  T(C): 40.1 (2020 11:59), Max: 40.1 (2020 11:59)  T(F): 104.1 (2020 11:59), Max: 104.1 (2020 11:59)  HR: 70 (2020 11:59) (69 - 81)  BP: 114/42 (2020 11:59) (84/58 - 129/55)  BP(mean): 66 (2020 17:27) (66 - 66)  RR: 20 (2020 11:59) (16 - 22)  SpO2: 93% (2020 09:31) (86% - 96%)  Drug Dosing Weight  Height (cm): 144.78 (2020 13:01)  Weight (kg): 62.6 (:)  BMI (kg/m2): 29.9 (:)  BSA (m2): 1.54 (:)      REVIEW OF SYSTEMS:  PT  LETHARGIC REVIEW OF SX FROM [facility]  CONSTITUTIONAL:  As per HPI.    HEENT:  Eyes:  No diplopia or blurred vision. ENT:  No earache, sore throat or runny nose.    CARDIOVASCULAR:  No pressure, squeezing, strangling, tightness, heaviness or aching about the chest, neck, axilla or epigastrium.    RESPIRATORY:  CHRONIC  shortness of breath,ON 02 AT HOME     GASTROINTESTINAL:  No nausea, vomiting or diarrhea.    GENITOURINARY:  No dysuria, frequency or urgency.    MUSCULOSKELETAL:  As per HPI.    SKIN:  No change in skin, hair or nails.    NEUROLOGIC:  No paresthesias, fasciculations, seizures or weakness.                  PHYSICAL EXAMINATION:    GENERAL: The patient is a well-developed, well-nourished __LETHARGIC ANSWERED QUESTIONS BUT THEN NODS OFF     VITAL SIGNS: T(C): 40.1 (20 @ 11:59), Max: 40.1 (20 @ 11:59)  HR: 70 (20 @ 11:59) (69 - 81)  BP: 114/42 (20 @ 11:59) (84/58 - 129/55)  RR: 20 (20 @ 11:59) (16 - 22)  SpO2: 93% (20 @ 09:31) (86% - 96%)  Wt(kg): --    HEENT: Head is normocephalic and atraumatic.  ANICTERIC  NECK: Supple. No carotid bruits.  No lymphadenopathy or thyromegaly.    LUNGS:COARSE BREATH SOUNDS    HEART: Regular rate and rhythm without murmur.    ABDOMEN: Soft, nontender, and nondistended.  Positive bowel sounds.  No hepatosplenomegaly was noted. NO REBOUND NO GUARDING    EXTREMITIES: NO EDEMA NO ERYTHEMA    NEUROLOGIC: LETHARGIC BUT  ANSWERS QUESTIONS      SKIN: No ulceration or induration present. NO RASH        BLOOD CULTURES  Culture Results:   No growth at 48 hours ( @ 23:12)  Culture Results:   No growth at 48 hours ( @ 14:49)  Culture Results:   No growth at 48 hours ( @ 14:48)       URINE CX          LABS:                        8.3    7.98  )-----------( 140      ( 2020 17:43 )             26.5     02-14    136  |  92<L>  |  57.0<H>  ----------------------------<  121<H>  5.1   |  29.0  |  1.84<H>    Ca    8.7      2020 08:48    TPro  6.5<L>  /  Alb  3.4  /  TBili  0.3<L>  /  DBili  x   /  AST  11  /  ALT  <5  /  AlkPhos  42  02-13    PT/INR - ( 2020 08:51 )   PT: 48.9 sec;   INR: 4.14 ratio         PTT - ( 2020 17:42 )  PTT:48.9 sec  Urinalysis Basic - ( 2020 22:27 )    Color: Yellow / Appearance: Clear / S.020 / pH: x  Gluc: x / Ketone: Negative  / Bili: Negative / Urobili: Negative   Blood: x / Protein: 15 / Nitrite: Positive   Leuk Esterase: Trace / RBC: 0-2 /HPF / WBC 0-2   Sq Epi: x / Non Sq Epi: Few / Bacteria: Few        RADIOLOGY & ADDITIONAL STUDIES:      ASSESSMENT/PLAN  88 y/o female who is on home o2 , 2 L nc for his h/o severe pulmonary htn was brought in as pt's daughter noticed that for past 1 week pt's oxygenation runs low 70's ? and she was in need of increase oxygen than her baseline. pt. has some cough, no fever. no sick contact. pt. walks with walker but denies exertional dyspnea, no cp. no syncopy.   no abd. pain. no n /v/d. pt. has noticed some left foot area pain and swelling, some warmth for past few days.PT WITH FALL AT HOME AS PER DAUGHTER AND HAD FOOT FRACTURE .    pt. has past medical history of Severe pulmonary hypertension (mixed etiology) on home oxygen (2L), HFpEF,  Bioprosthetic mitral valve, COPD ? Atrial fibrillation (on coumadin), permanent pacemaker.   FEVER 104  MORE LETHARGIC AS PER DAUGHTER  UNCLEAR SOURCE ? PNEUMONIA WILL RECOMMEND CT OF CHEST TO EVALUATE AS CXR OK  ALSO SUGGEST ABG NOW CHECK BLOODPH  WOULD DO CT HEAD AS PT WITH FALL AT HOME  TRANSFER TO MONITOR SETTING  PT ON ZOSYN  WILL CONTINUE FOR NOW  WILL ADD ZITHROMAX FOR ATYPICAL COVERAGE UNIT CT IS BACK  WILL FOLLOW UP WITH FURTHER RECOMMENDATIONS   D/W DAUGHTER                BETTIE VELAZQUEZ MD

## 2020-02-14 NOTE — PROGRESS NOTE ADULT - ASSESSMENT
Assessment:  89 year old woman with past medical history of Severe pulmonary hypertension (mixed etiology) on home oxygen (2L), HFpEF,  Bioprosthetic mitral valve, COPD, Atrial fibrillation (on coumadin), permanent pacemaker, who was sent to the hospital due to low oxygen ~ 70s at home, likely due to severe pulmonary hypertension. Patient denies exertional angina and troponin is negative.     Patient now meets sepsis criteria with fever and low blood pressure. Primary team performing infectious workup.    Recommendations:  [] Hypoxemia: Likely secondary to history of severe pulmonary hypertension. Discussed with outpatient cardiologist and no need for repeat echo this admission. Echo from October with LVEF 55-60% and right ventricular volume and pressure overload. Yesterday evening labs resulted with elevated Cr, patient with Cr as high as 1.6 in January 2020 as outpatient. Would hold diuretics today and over the weekend, and encourage PO hydration. Monitor daily BMP.  [] Atrial fibrillation: INR elevated, hold coumadin today  [] Left ankle pain/erythemia workup per primary team, podiatry consulted, patient with possible left fifth metatarsal fracture   [] Sepsis: Recommend to check urine culture, blood cultures, repeat CXR. Would hold home antihypertensives today    Thank you for the consult. We will follow along.    Skylar Barahona MD  Cardiology, [facility]

## 2020-02-14 NOTE — CONSULT NOTE ADULT - SUBJECTIVE AND OBJECTIVE BOX
Palliative Medicine Initial Consultation Note    HPI:  89 y/o female who is on home o2 , 2 L nc for his h/o severe pulmonary htn was brought in as pt's daughter noticed that for past 1 week pt's oxygenation runs low 70's ? and she was in need of increase oxygen than her baseline. pt. has some cough, no fever. no sick contact. pt. walks with walker but denies exertional dyspnea, no cp. no syncopy. pt.sleeps with head for long time and that did not change. no abd. pain. no n /v/d. pt. has noticed some left foot area pain and swelling, some warmth for past few days. As per pt. she has been ambulatory and not sure how pain and swelling in left foot started, there has been no fall or trauma to left foot as per history. Pt's daughter called her cardiologist and she was instructed to go to the ER.   pt. has past medical history of Severe pulmonary hypertension (mixed etiology) on home oxygen (2L), HFpEF,  Bioprosthetic mitral valve, COPD ? Atrial fibrillation (on coumadin), permanent pacemaker.     Daughter states mother O2 dependent, for 2years. Mother lives with her, and functionally declining. She is generally home bound, becomes SOB with basic ADLs, and  needs much assistance     PERTINENT PMH REVIEWED:  [ x] YES [ ] NO         Atrial fibrillation     H/O pulmonary hypertension     History of COPD.     PAST SURGICAL HISTORY:  H/O prosthetic mitral valve bioprosthetic.    Pacemaker.     FAMILY HISTORY:  FH: heart disease, mother    SOCIAL HISTORY:  EtOH [ ] Yes  [x ] No                                    Drugs [ ] Yes [ x] No                                   [ ] smoker [x ] nonsmoker                                    Admitted from: [ x] home [ ] SNF _________ [ ] [facility] ________    Surrogate/HCP/Guardian:   FAMILY HISTORY:  FH: heart disease: mother      Baseline ADLs (prior to admission):  Independent [ ] moderately [ ] fully   Dependent   [ ] moderately [x ]fully    MEDICATIONS  (STANDING):  albuterol/ipratropium for Nebulization 3 milliLiter(s) Nebulizer every 6 hours  atorvastatin 10 milliGRAM(s) Oral at bedtime  azithromycin  IVPB      azithromycin  IVPB 500 milliGRAM(s) IV Intermittent once  bosentan 125 milliGRAM(s) Oral two times a day  digoxin     Tablet 0.125 milliGRAM(s) Oral daily  lactobacillus acidophilus 1 Tablet(s) Oral two times a day with meals  metoprolol tartrate 25 milliGRAM(s) Oral two times a day  nystatin Powder 1 Application(s) Topical two times a day  piperacillin/tazobactam IVPB.. 3.375 Gram(s) IV Intermittent every 8 hours  pregabalin 100 milliGRAM(s) Oral two times a day  sertraline 50 milliGRAM(s) Oral daily    MEDICATIONS  (PRN):  acetaminophen   Tablet .. 650 milliGRAM(s) Oral every 6 hours PRN Temp greater or equal to 38C (100.4F), Mild Pain (1 - 3)      Allergies    penicillin (Hives)    Intolerances    Karnofsky Performance Score/Palliative Performance Status Version 2: 40  %    Vital Signs Last 24 Hrs  T(C): 36.7 (2020 15:30), Max: 40.1 (2020 11:59)  T(F): 98 (2020 15:30), Max: 104.1 (2020 11:59)  HR: 70 (2020 15:30) (69 - 81)  BP: 91/52 (2020 15:30) (84/58 - 129/55)  BP(mean): 66 (2020 17:27) (66 - 66)  RR: 18 (2020 15:30) (16 - 22)  SpO2: 99% (2020 15:30) (86% - 99%)    PHYSICAL EXAM:    General: Lethargic elderly woman NAD     HEENT: [ x] normal  [ ] dry mouth  [ ] ET tube/trach    Lungs: [x ] comfortable  On O2 mask  CV: [ x] normal  [ ] tachycardia    GI: [ x] normal  [ ] distended  [ ] tender  [ ] no BS               [ ] PEG/NG/OG tube    : [x ] normal  [ ] incontinent  [ ] oliguria/anuria  [ ] [place]    MSK: [ ] normal  [ x] weakness  [ ] edema             [ ] ambulatory  [x ] bedbound/wheelchair bound    Skin: [ ] normal  [ ] pressure ulcers- Stage_____  [ ] no rash    LABS:                        8.3    7.98  )-----------( 140      ( 2020 17:43 )             26.5     02-14    136  |  92<L>  |  57.0<H>  ----------------------------<  121<H>  5.1   |  29.0  |  1.84<H>    Ca    8.7      2020 08:48    TPro  6.5<L>  /  Alb  3.4  /  TBili  0.3<L>  /  DBili  x   /  AST  11  /  ALT  <5  /  AlkPhos  42  02-13    PT/INR - ( 2020 08:51 )   PT: 48.9 sec;   INR: 4.14 ratio         PTT - ( 2020 17:42 )  PTT:48.9 sec  Urinalysis Basic - ( 2020 22:27 )    Color: Yellow / Appearance: Clear / S.020 / pH: x  Gluc: x / Ketone: Negative  / Bili: Negative / Urobili: Negative   Blood: x / Protein: 15 / Nitrite: Positive   Leuk Esterase: Trace / RBC: 0-2 /HPF / WBC 0-2   Sq Epi: x / Non Sq Epi: Few / Bacteria: Few      I&O's Summary    2020 07:01  -  2020 07:00  --------------------------------------------------------  IN: 0 mL / OUT: 500 mL / NET: -500 mL        RADIOLOGY & ADDITIONAL STUDIES:    < from: Xray Chest 1 View- PORTABLE-Urgent (20 @ 18:00) >   EXAM:  XR CHEST PORTABLE URGENT 1V                          PROCEDURE DATE:  2020          INTERPRETATION:  AP chest on 2020 at 5:11 PM. Patient has sepsis and fever to 102.8.    Heart is magnified by technique. Sternotomy and left-sided pacemaker again noted.    Rather thick bandlike atelectasis off the left hilum again noted.    Chest is similar to .    IMPRESSION: Bandlike atelectasis off left hilum again noted. Chest is stable.      JESSICA KAHN M.D.,ATTENDING RADIOLOGIST  This document has been electronically signed. 2020  6:53PM        < end of copied text >      < from: Xray Foot AP + Lateral, Right (20 @ 09:44) >   EXAM:  FOOT 2VIEWS RT                          PROCEDURE DATE:  2020          INTERPRETATION:  Right foot    HISTORY: Pain near ankle      Three views of the right foot show questionable fracture of the head of the fifth metatarsal bone in one view. The remaining bony structures and joint spaces are maintained.    IMPRESSION: Questionable fracture, fifth metatarsal. Clinical correlation is suggested.        Thank you for this referral.    < end of copied text >        ADVANCE DIRECTIVES:  [ ] YES [x ] NO   DNR [ ] YES [x ] NO  Completed on:                     MOLST  [ ] YES [ x] NO   Completed on:  Living Will  [ ] YES [ ] NO   Completed on: Reported has LW    Thank you for the opportunity to assist with the care of this patient.   [place] [facility] [phone].

## 2020-02-14 NOTE — CONSULT NOTE ADULT - ASSESSMENT
89yr woman hx of severe pulmonary hypertension, O2 dependent, Afib, COPD, PM, bioprosthetic mitral valve,  recent fall at home admitted with hypoxia likely related to PH, now with sepsis possibly 2/2 PNA.

## 2020-02-14 NOTE — CONSULT NOTE ADULT - SUBJECTIVE AND OBJECTIVE BOX
PULMONARY CONSULT NOTE      FRANCISCO [id number]    Patient is a 89y old  Female who presents with a chief complaint of low oxygenation (2020 12:38)  Patient with known moderate to severe pulmonary hypertension presented with worsening shortness of breath and worsening oxygenation status.  Typically on 2 liters at home.  Patient currently somnolent though easily wakens and answers questions appropriately.  No recent fevers or unusual cough though has noted some cough day of admission.  Patient with known RV overload state and was given lasix via cardiology>now being held due to increased BUN which has otherwise been stable.  Patient on Tracleer as outpatient.  She has mild COPD based on PFT data from 2018.  Currently on 50% and saturation is at 97%.  Her current situation is complicated by left foot injury/fracture.  She has been on anticoagulation now held due to increasing INR. Chest xray reviewed without acute changes.  Patient has as of last echo moderate aortic stenosis.      INTERVAL HPI/OVERNIGHT EVENTS:    MEDICATIONS  (STANDING):  albuterol/ipratropium for Nebulization 3 milliLiter(s) Nebulizer every 6 hours  atorvastatin 10 milliGRAM(s) Oral at bedtime  azithromycin  IVPB      azithromycin  IVPB 500 milliGRAM(s) IV Intermittent once  bosentan 125 milliGRAM(s) Oral two times a day  digoxin     Tablet 0.125 milliGRAM(s) Oral daily  lactobacillus acidophilus 1 Tablet(s) Oral two times a day with meals  metoprolol tartrate 25 milliGRAM(s) Oral two times a day  nystatin Powder 1 Application(s) Topical two times a day  piperacillin/tazobactam IVPB.. 3.375 Gram(s) IV Intermittent every 8 hours  pregabalin 100 milliGRAM(s) Oral two times a day  sertraline 50 milliGRAM(s) Oral daily      MEDICATIONS  (PRN):  acetaminophen   Tablet .. 650 milliGRAM(s) Oral every 6 hours PRN Temp greater or equal to 38C (100.4F), Mild Pain (1 - 3)      Allergies    penicillin (Hives)    Intolerances        PAST MEDICAL & SURGICAL HISTORY:  History of COPD  H/O pulmonary hypertension  Atrial fibrillation  H/O prosthetic mitral valve: bioprosthetic.  Pacemaker      FAMILY HISTORY:  FH: heart disease: mother      SOCIAL HISTORY  Smoking History: Former stopped at age 50 1 PPD    REVIEW OF SYSTEMS:    CONSTITUTIONAL:  As per HPI.    HEENT:  Eyes:  No diplopia or blurred vision. ENT:  No earache, sore throat or runny nose.    CARDIOVASCULAR:  No pressure, squeezing, tightness, heaviness or aching about the chest; no palpitations.    RESPIRATORY:  Per HPI    GASTROINTESTINAL:  No nausea, vomiting or diarrhea.    GENITOURINARY:  No dysuria, frequency or urgency.    MUSCULOSKELETAL:  Left foot injury/pain    SKIN:  No new lesions.    NEUROLOGIC:  No paresthesias, fasciculations, seizures or weakness.    PSYCHIATRIC:  No disorder of thought or mood.    ENDOCRINE:  No heat or cold intolerance, polyuria or polydipsia.    HEMATOLOGICAL:  No easy bruising or bleeding.     Vital Signs Last 24 Hrs  T(C): 40.1 (2020 12:00), Max: 40.1 (2020 11:59)  T(F): 104.1 (2020 12:00), Max: 104.1 (2020 11:59)  HR: 70 (2020 11:59) (69 - 81)  BP: 114/42 (2020 11:59) (84/58 - 129/55)  BP(mean): 66 (2020 17:27) (66 - 66)  RR: 20 (2020 11:59) (16 - 22)  SpO2: 93% (2020 09:31) (86% - 96%)    PHYSICAL EXAMINATION:    GENERAL: The patient is a well-developed, well-nourished _____in no apparent distress.     HEENT: Head is normocephalic and atraumatic. Extraocular muscles are intact. Mucous membranes are moist.     NECK: Supple.     LUNGS: Rales at right base otherwise relatively clear though diminished    HEART: Iregular rate and rhythm; 3/6 systolic murmur    ABDOMEN: Soft, nontender, and nondistended.  No hepatosplenomegaly is noted.    EXTREMITIES: Without any cyanosis, clubbing, rash, lesions or edema.    NEUROLOGIC: Somnolent but no focality    SKIN: No ulceration or induration present.      LABS:                        8.3    7.98  )-----------( 140      ( 2020 17:43 )             26.5     02-14    136  |  92<L>  |  57.0<H>  ----------------------------<  121<H>  5.1   |  29.0  |  1.84<H>    Ca    8.7      2020 08:48    TPro  6.5<L>  /  Alb  3.4  /  TBili  0.3<L>  /  DBili  x   /  AST  11  /  ALT  <5  /  AlkPhos  42  02-13    PT/INR - ( 2020 08:51 )   PT: 48.9 sec;   INR: 4.14 ratio         PTT - ( 2020 17:42 )  PTT:48.9 sec  Urinalysis Basic - ( 2020 22:27 )    Color: Yellow / Appearance: Clear / S.020 / pH: x  Gluc: x / Ketone: Negative  / Bili: Negative / Urobili: Negative   Blood: x / Protein: 15 / Nitrite: Positive   Leuk Esterase: Trace / RBC: 0-2 /HPF / WBC 0-2   Sq Epi: x / Non Sq Epi: Few / Bacteria: Few      ABG - ( 2020 22:33 )  pH, Arterial: 7.43  pH, Blood: x     /  pCO2: 51    /  pO2: 52    / HCO3: 32    / Base Excess: 8.4   /  SaO2: 88                      Serum Pro-Brain Natriuretic Peptide: 4596 pg/mL (20 @ 18:47)    Lactate, Blood: 1.3 mmol/L (20 @ 22:19)  Lactate, Blood: 1.2 mmol/L (20 @ 17:41)    Procalcitonin, Serum: 0.13 ng/mL (20 @ 17:42)      MICROBIOLOGY:    RADIOLOGY & ADDITIONAL STUDIES:< from: Xray Chest 1 View- PORTABLE-Urgent (20 @ 18:00) >     EXAM:  XR CHEST PORTABLE URGENT 1V                          PROCEDURE DATE:  2020          INTERPRETATION:  AP chest on 2020 at 5:11 PM. Patient has sepsis and fever to 102.8.    Heart is magnified by technique. Sternotomy and left-sided pacemaker again noted.    Rather thick bandlike atelectasis off the left hilum again noted.    Chest is similar to .    IMPRESSION: Bandlike atelectasis off left hilum again noted. Chest is stable.                JESSICA KAHN M.D.,ATTENDING RADIOLOGIST  This document has been electronically signed. 2020  6:53PM              < end of copied text >

## 2020-02-15 NOTE — PROGRESS NOTE ADULT - ASSESSMENT
88 y/o female who is on home o2 , 2 L nc for his h/o severe pulmonary htn    # Fever, code sepsis  had code sepsis yest  pan c/s sent. so far neg  bld and urine c/s testing  empiric zosyn started  though allergies lists PCN- d/w dgtr. pt gets hives with PCN  today fever of 104  iv tylenol x 1 dose ordered  ct non con ordered.   ID consulted  if above w/u neg will scan abd/ pelvis    # acute metabolic encephaloapthy  sec to above  add head CT  upgrade tos tepdown    # pt c/o rt foot pain too  foot xray done per podiatry  which shows ? metatarsal #  await podiatry f/u today    - elevated INR  sec to poor po intake  suspect vit k def sec to same  hold Coumadin today  if still increasing tomorrow consider reversal with vit K    - lethargy, somnolence  likely sec to over diuresis  stop lasix on 2/13    - s/p acute on chronic hypoxic respiratory failure   likely multifactorial, possible some volume over load, iv lasix as per cardiologist at this point and follow clinically. O2 suport, duoneb.     improved and at baseline now   no need for rpt ECHO- prior ECHO noted  now sec to fever. lethargy requiring Vm 35 % at baseline. for testing and such increase it to 50%    - Chronic atrial fibrillation, rate controlled, with PPM   will continue with coumadin , dig and b. blk.    - 5th metatarsal head # with avulsion on left  Xrays neg for acute finding  Podiatry appreciated  left foot splint, leg elevation, NWB  pain controlled    - Pulmonary htn,    Continue Bosanten    - Anemia of chr dz  stable  transfuse PRN    PT eval- she has place at [facility] but medically not clear 89 y/o female who is on home o2 , 2 L nc for his h/o severe pulmonary htn    > Fever, likely due to pna b/l   f/u cxs   c/w zosyn and azithromycin   ID consulted    > pt c/o rt foot pain too/5th metatarsal head # with avulsion on left  foot xray done per podiatry  which shows ? metatarsal fx   await podiatry f/u today    >Xrays neg for acute finding  Podiatry appreciated  left foot splint, leg elevation, NWB  pain controlled    > s/p acute on chronic hypoxic respiratory failure/ improving / likely due to pna    as above   Pulmonary htn,   Continue Bosanten      > Chronic atrial fibrillation, rate controlled, with PPM  will continue with coumadin , dig and b. blk.  elevated INR  hold Coumadin today  s/p vit k     >Anemia of chr dz  stable  transfuse PRN    PT eval- she has place at [facility]. but medically not clear  spoke to patient's daughter ainsley , updated of plan of care

## 2020-02-15 NOTE — PROGRESS NOTE ADULT - SUBJECTIVE AND OBJECTIVE BOX
PULMONARY PROGRESS NOTE      FRANCISCO MONTE-[id number]    Patient is a 89y old  Female who presents with a chief complaint of low oxygenation (2020 16:01)      INTERVAL HPI/OVERNIGHT EVENTS:  Much improved  Awake and alert >eating  On nasal O2>sat 98%    MEDICATIONS  (STANDING):  albuterol/ipratropium for Nebulization 3 milliLiter(s) Nebulizer every 6 hours  atorvastatin 10 milliGRAM(s) Oral at bedtime  azithromycin  IVPB      azithromycin  IVPB 500 milliGRAM(s) IV Intermittent every 24 hours  bosentan 125 milliGRAM(s) Oral two times a day  digoxin     Tablet 0.125 milliGRAM(s) Oral daily  lactobacillus acidophilus 1 Tablet(s) Oral two times a day with meals  metoprolol tartrate 25 milliGRAM(s) Oral two times a day  nystatin Powder 1 Application(s) Topical two times a day  phytonadione  IVPB 5 milliGRAM(s) IV Intermittent once  piperacillin/tazobactam IVPB.. 3.375 Gram(s) IV Intermittent every 8 hours  pregabalin 100 milliGRAM(s) Oral two times a day  sertraline 50 milliGRAM(s) Oral daily      MEDICATIONS  (PRN):  acetaminophen   Tablet .. 650 milliGRAM(s) Oral every 6 hours PRN Temp greater or equal to 38C (100.4F), Mild Pain (1 - 3)      Allergies    penicillin (Hives)    Intolerances        PAST MEDICAL & SURGICAL HISTORY:  History of COPD  H/O pulmonary hypertension  Atrial fibrillation  H/O prosthetic mitral valve: bioprosthetic.  Pacemaker      SOCIAL HISTORY  Smoking History: Never      REVIEW OF SYSTEMS:    CONSTITUTIONAL:  No distress    HEENT:  Eyes:  No diplopia or blurred vision. ENT:  No earache, sore throat or runny nose.    CARDIOVASCULAR:  No pressure, squeezing, tightness, heaviness or aching about the chest; no palpitations.    RESPIRATORY:  + GAFFNEY    GASTROINTESTINAL:  No nausea, vomiting or diarrhea.    GENITOURINARY:  No dysuria, frequency or urgency.    MUSCULOSKELETAL:  No joint pain    SKIN:  No new lesions.    NEUROLOGIC:  No paresthesias, fasciculations, seizures or weakness.    PSYCHIATRIC:  No disorder of thought or mood.    ENDOCRINE:  No heat or cold intolerance, polyuria or polydipsia.    HEMATOLOGICAL:  No easy bruising or bleeding.     Vital Signs Last 24 Hrs  T(C): 36.8 (15 Feb 2020 08:11), Max: 40.1 (2020 11:59)  T(F): 98.3 (15 Feb 2020 08:11), Max: 104.1 (2020 11:59)  HR: 70 (15 Feb 2020 08:42) (70 - 79)  BP: 108/50 (15 Feb 2020 08:11) (91/52 - 114/42)  BP(mean): --  RR: 18 (15 Feb 2020 08:11) (18 - 20)  SpO2: 96% (15 Feb 2020 08:42) (94% - 100%)    PHYSICAL EXAMINATION:    GENERAL: The patient is awake and alert in no apparent distress.     HEENT: Head is normocephalic and atraumatic. Extraocular muscles are intact. Mucous membranes are moist.    NECK: Supple.    LUNGS: Clear to auscultation without wheezing, rales or rhonchi; respirations unlabored    HEART: Regular rate and rhythm without murmur.    ABDOMEN: Soft, nontender, and nondistended.      EXTREMITIES: Without any cyanosis, clubbing, rash, lesions or edema.    NEUROLOGIC: Grossly intact.    SKIN: No ulceration or induration present.      LABS:                        7.6    8.42  )-----------( 127      ( 15 Feb 2020 08:08 )             25.2     02-15    137  |  93<L>  |  60.0<H>  ----------------------------<  126<H>  3.6   |  32.0<H>  |  1.72<H>    Ca    8.6      15 Feb 2020 08:08    TPro  6.5<L>  /  Alb  3.4  /  TBili  0.3<L>  /  DBili  x   /  AST  11  /  ALT  <5  /  AlkPhos  42  02-13    PT/INR - ( 15 Feb 2020 08:08 )   PT: 98.4 sec;   INR: 8.16 ratio         PTT - ( 2020 17:42 )  PTT:48.9 sec  Urinalysis Basic - ( 2020 22:27 )    Color: Yellow / Appearance: Clear / S.020 / pH: x  Gluc: x / Ketone: Negative  / Bili: Negative / Urobili: Negative   Blood: x / Protein: 15 / Nitrite: Positive   Leuk Esterase: Trace / RBC: 0-2 /HPF / WBC 0-2   Sq Epi: x / Non Sq Epi: Few / Bacteria: Few      ABG - ( 2020 22:33 )  pH, Arterial: 7.43  pH, Blood: x     /  pCO2: 51    /  pO2: 52    / HCO3: 32    / Base Excess: 8.4   /  SaO2: 88                          Procalcitonin, Serum: 0.13 ng/mL (20 @ 17:42)      MICROBIOLOGY:    RADIOLOGY & ADDITIONAL STUDIES:< from: CT Chest No Cont (20 @ 16:36) >   EXAM:  CT CHEST                          PROCEDURE DATE:  2020          INTERPRETATION:  CLINICAL INFORMATION: Hypoxia    COMPARISON: Comparison with 3/20/2018 was available    PROCEDURE:   CT of the Chest was performed without intravenous contrast.  Sagittal and coronal reformats were performed.      FINDINGS:    LUNGS AND AIRWAYS: No gross endobronchial lesion is seen. Mild mosaic perfusion. 8 mm nodular patchy opacity in the right upper lobe seen best on series 3 image 34. Additional faint nodular opacity in the upper lobes bilaterally and left lower lobe. This was not seen on the prior exam from 3/20/2018 and may be due to infection. Underlying neoplastic process cannot entirely be excluded and follow-up following treatment is recommended. Atelectatic changes in the lungs. This is most pronounced in the right lower lobe. There is mild interlobular septal thickening which may be related to cardiac dysfunction. Please correlate clinically.    PLEURA: Minimal left pleural fluid.This was not seen previously.    MEDIASTINUM AND JASIEL: Small mediastinal lymph nodes which are not considered significant by size criteria.]    VESSELS: Vascular calcifications.    HEART: Cardiomegaly. Wires from electronic cardiac device in the right atrium and ventricle. Mechanical mitral valve. Pulmonary artery is mildly prominent. Pulmonary artery hypertension cannot be excluded. Please correlate clinically. No pericardial effusion.    CHEST WALL AND LOWER NECK: A electronic device in the subcutaneous tissue of the left. Anterior chest wall connected to the cardiac wires. Sternotomy wires. Calcifications in the breasts bilaterally.    VISUALIZED UPPER ABDOMEN: Gallstones. Probable pharyngean cap in the gallbladder although limited in evaluation. Nonemergent right upper quadrant ultrasound may be performed for confirmation.    BONES: Degenerative changes.    IMPRESSION:     Mild patchy nodular opacities bilaterally as above which are concerning for infection. Underlying true nodules cannot be excluded and follow-up following treatment is recommended. Mild mosaic perfusion. Cardiomegaly. Minimal left pleural fluid. Mild bilateral interlobular septal thickening which may be related to cardiac dysfunction. Please correlate clinically.    Gallstones. Probable partially imaged pharyngean cap in the gallbladder fundus although this is very limited in evaluation. Recommend nonemergent right upper quadrant ultrasound to evaluate the gallbladder fundus to exclude pathology.                    MARY YBARRA M.D., ATTENDING RADIOLOGIST  This document has been electronically signed. 2020  5:00PM                < end of copied text >

## 2020-02-15 NOTE — PROGRESS NOTE ADULT - SUBJECTIVE AND OBJECTIVE BOX
HARMAN MONTE  [id number]        Chief Complaint: follow up hypoxia/PHTN      Subjective: no cp/sob/palps        acetaminophen   Tablet .. 650 milliGRAM(s) Oral every 6 hours PRN  albuterol/ipratropium for Nebulization 3 milliLiter(s) Nebulizer every 6 hours  atorvastatin 10 milliGRAM(s) Oral at bedtime  azithromycin  IVPB      azithromycin  IVPB 500 milliGRAM(s) IV Intermittent every 24 hours  bosentan 125 milliGRAM(s) Oral two times a day  digoxin     Tablet 0.125 milliGRAM(s) Oral daily  lactobacillus acidophilus 1 Tablet(s) Oral two times a day with meals  metoprolol tartrate 25 milliGRAM(s) Oral two times a day  nystatin Powder 1 Application(s) Topical two times a day  phytonadione  IVPB 5 milliGRAM(s) IV Intermittent once  piperacillin/tazobactam IVPB.. 3.375 Gram(s) IV Intermittent every 8 hours  pregabalin 100 milliGRAM(s) Oral two times a day  sertraline 50 milliGRAM(s) Oral daily          Physical Exam:  T(C): 36.8 (02-15-20 @ 08:11), Max: 40.1 (02-14-20 @ 11:59)  HR: 70 (02-15-20 @ 08:42) (70 - 79)  BP: 108/50 (02-15-20 @ 08:11) (91/52 - 114/42)  RR: 18 (02-15-20 @ 08:11) (18 - 20)  SpO2: 96% (02-15-20 @ 08:42) (94% - 100%)  Wt(kg): --  General: elderly frail F Comfortable in NAD  HEENT: dry MM, sclera anicteric, Nasal cannular in place  Resp: CTA bilaterally  CVS: nl s1s2, rrr, no s3/JVD, II/VI harsh systolic murmur at the base  Abd: soft, NT, ND, BS+  Ext: No c/c/e, left ankle wrapped and in brace  Neuro A&O x2  Psych: Normal affect    I&O's Summary        Labs:   15 Feb 2020 08:08    137    |  93     |  60.0   ----------------------------<  126    3.6     |  32.0   |  1.72     Ca    8.6        15 Feb 2020 08:08    TPro  6.5    /  Alb  3.4    /  TBili  0.3    /  DBili  x      /  AST  11     /  ALT  <5     /  AlkPhos  42     13 Feb 2020 17:42                          7.6    8.42  )-----------( 127      ( 15 Feb 2020 08:08 )             25.2     PT/INR - ( 15 Feb 2020 08:08 )   PT: 98.4 sec;   INR: 8.16 ratio         PTT - ( 13 Feb 2020 17:42 )  PTT:48.9 sec          Outpatient TTE (10/2019):  LVEF 55-60%  Right ventricular volume and pressure overload  Bovine mitral valve prosthesis present, mitral prosthesis regurgitation  Moderate aortic valve stenosis     ECG (2/11/2020): ventricular paced            Assessment and Plan:   89 year old woman with past medical history of Severe pulmonary hypertension (mixed etiology) on home oxygen (2L), chronic HFpEF,  Bioprosthetic mitral valve, COPD, Atrial fibrillation (on coumadin), permanent pacemaker, moderate AS who was sent to the hospital due to low oxygen ~ 70s at home, likely due to severe pulmonary hypertension. Patient denies exertional angina and troponin is negative.   -Patient now meets sepsis criteria with fever and low blood pressure. Primary team performing infectious workup with CT chest showing possible upper lobe infiltrates. On broad spectrum antibioitics  -Appears volume depleted and has had lasix held so far  -INR supratherapeutic  -Per RN, mental status improved today      Plan:  1. Continue off lasix  2. INR supratherapeutic, coumadin held and will be getting vitamin K. Will need to monitor closely. Likely high from poor nutrition and antibiotics  3. Appreciate palliative assistannce, now DNr/DNI. Possible hospice  4. Continue bosentan for PHTN  5. Supportive care    Tomas Spicer MD

## 2020-02-15 NOTE — PROGRESS NOTE ADULT - ASSESSMENT
Patient much improved  ?related to infection>now afebrile on abx  No evidence of pneumonia  ?source of fever  PT/INR markedly elevated>no evidence of bleeding at this time.     Plan:  1.Titrate O2 as able  2.Agree with VitK analog>follow PT/INR  3.Abx per ID  4.Continue Tracleer  5.MOLST in place >appropriate

## 2020-02-16 NOTE — PROVIDER CONTACT NOTE (CRITICAL VALUE NOTIFICATION) - SITUATION
pt asymptomatic, skin is pale / vs/ pt denies any dizziness, lightheadedness, fatigue, or sob ( at bedside, spo2 97 3LNC) /  called and notified//

## 2020-02-16 NOTE — PROGRESS NOTE ADULT - SUBJECTIVE AND OBJECTIVE BOX
cc: pna, fever     INTERVAL HPI/ OVERNIGHT EVENTS: pt seen and eval. comfortable. in no acute distress., no fever or chills. c/o neck pain , no rigidity     Vital Signs Last 24 Hrs  T(C): 36.6 (16 Feb 2020 16:29), Max: 36.7 (16 Feb 2020 08:32)  T(F): 97.8 (16 Feb 2020 16:29), Max: 98 (16 Feb 2020 08:32)  HR: 70 (16 Feb 2020 17:45) (70 - 73)  BP: 101/64 (16 Feb 2020 17:45) (90/52 - 104/61)  BP(mean): --  RR: 18 (16 Feb 2020 16:29) (18 - 19)  SpO2: 99% (16 Feb 2020 16:29) (96% - 100%)    PHYSICAL EXAM-  General: An elderly  female sitting up in bed  not in distress.  HEENT: AT, NC. PERRL. intact EOM. no throat erythema or exudate.   Chest: few scattered basal crackles bilaterally, no wheeze appreciated.  Heart: S1,S2. RRR. + systolic heart murmur. ankle edema left more than right noted.  Abdomen: soft. non-tender. non-distended. + BS.   Ext: no calf tenderness. ROM at left ankle mildly diminished due to pain. edema , warmth and mild erythema noted over dorsal aspect and around ankle, no open wound, rt. ankle / foot without sig. swelling, no pain.   Neuro: AAO x3. no focal weakness. no speech disorder. CN intact.   Skin: no diaphoresis, no cyanosis, other left foot skin findings as in ext. exam.   psych : pleasant, no agitation. mood ok                          7.0    5.29  )-----------( 126      ( 16 Feb 2020 08:07 )             22.7   02-16    134<L>  |  90<L>  |  64.0<H>  ----------------------------<  119<H>  3.9   |  32.0<H>  |  1.83<H>    Ca    8.7      16 Feb 2020 08:07    TPro  6.1<L>  /  Alb  2.9<L>  /  TBili  0.4  /  DBili  x   /  AST  12  /  ALT  6   /  AlkPhos  38<L>  02-16

## 2020-02-16 NOTE — DIETITIAN INITIAL EVALUATION ADULT. - ETIOLOGY
related to inability to meet increased protein-energy needs secondary to pneumonia, pulmonary hypertension, sepsis, advanced age

## 2020-02-16 NOTE — PROGRESS NOTE ADULT - SUBJECTIVE AND OBJECTIVE BOX
PULMONARY PROGRESS NOTE      FRANCISCO MONTE-[id number]    Patient is a 89y old  Female who presents with a chief complaint of low oxygenation (16 Feb 2020 09:35)      INTERVAL HPI/OVERNIGHT EVENTS:  Continues to oxygenate well with conventional nasal cannula  Hct significantly decreased  Patient denies abdominal pain, nausea.  No reports of active bleed though the other day apparently "coughed" blood    MEDICATIONS  (STANDING):  albuterol/ipratropium for Nebulization 3 milliLiter(s) Nebulizer every 6 hours  atorvastatin 10 milliGRAM(s) Oral at bedtime  azithromycin  IVPB      azithromycin  IVPB 500 milliGRAM(s) IV Intermittent every 24 hours  bosentan 125 milliGRAM(s) Oral two times a day  digoxin     Tablet 0.125 milliGRAM(s) Oral daily  lactobacillus acidophilus 1 Tablet(s) Oral two times a day with meals  metoprolol tartrate 25 milliGRAM(s) Oral two times a day  nystatin Powder 1 Application(s) Topical two times a day  piperacillin/tazobactam IVPB.. 3.375 Gram(s) IV Intermittent every 8 hours  pregabalin 100 milliGRAM(s) Oral two times a day  sertraline 50 milliGRAM(s) Oral daily      MEDICATIONS  (PRN):  acetaminophen   Tablet .. 650 milliGRAM(s) Oral every 6 hours PRN Temp greater or equal to 38C (100.4F), Mild Pain (1 - 3)      Allergies    penicillin (Hives)    Intolerances        PAST MEDICAL & SURGICAL HISTORY:  History of COPD  H/O pulmonary hypertension  Atrial fibrillation  H/O prosthetic mitral valve: bioprosthetic.  Pacemaker      SOCIAL HISTORY  Smoking History: Nonsmoker      REVIEW OF SYSTEMS:    CONSTITUTIONAL:  No distress    HEENT:  Eyes:  No diplopia or blurred vision. ENT:  No earache, sore throat or runny nose.    CARDIOVASCULAR:  No pressure, squeezing, tightness, heaviness or aching about the chest; no palpitations.    RESPIRATORY:  No cough, shortness of breath, PND or orthopnea. Mild SOBOE    GASTROINTESTINAL:  No nausea, vomiting or diarrhea.    GENITOURINARY:  No dysuria, frequency or urgency.    MUSCULOSKELETAL:  No joint pain    SKIN:  No new lesions.    NEUROLOGIC:  No paresthesias, fasciculations, seizures or weakness.    PSYCHIATRIC:  No disorder of thought or mood.    ENDOCRINE:  No heat or cold intolerance, polyuria or polydipsia.    HEMATOLOGICAL:  No easy bruising or bleeding.     Vital Signs Last 24 Hrs  T(C): 36.7 (16 Feb 2020 08:32), Max: 38.1 (15 Feb 2020 18:12)  T(F): 98 (16 Feb 2020 08:32), Max: 100.6 (15 Feb 2020 18:12)  HR: 71 (16 Feb 2020 09:22) (70 - 73)  BP: 98/61 (16 Feb 2020 08:32) (90/52 - 124/58)  BP(mean): --  RR: 19 (16 Feb 2020 08:32) (18 - 19)  SpO2: 98% (16 Feb 2020 09:22) (94% - 100%)    PHYSICAL EXAMINATION:    GENERAL: The patient is awake and alert in no apparent distress.     HEENT: Head is normocephalic and atraumatic. Extraocular muscles are intact. Mucous membranes are moist.    NECK: Supple.    LUNGS: Clear to auscultation without wheezing, rales or rhonchi; respirations unlabored    HEART: Regular rate and rhythm without murmur.    ABDOMEN: Soft, nontender, and nondistended.      EXTREMITIES: Without any cyanosis, clubbing, rash, lesions or edema.    NEUROLOGIC: Grossly intact.    SKIN: No ulceration or induration present.      LABS:                        7.0    5.29  )-----------( 126      ( 16 Feb 2020 08:07 )             22.7     02-16    134<L>  |  90<L>  |  64.0<H>  ----------------------------<  119<H>  3.9   |  32.0<H>  |  1.83<H>    Ca    8.7      16 Feb 2020 08:07    TPro  6.1<L>  /  Alb  2.9<L>  /  TBili  0.4  /  DBili  x   /  AST  12  /  ALT  6   /  AlkPhos  38<L>  02-16    PT/INR - ( 16 Feb 2020 08:07 )   PT: 15.4 sec;   INR: 1.35 ratio                         Procalcitonin, Serum: 0.13 ng/mL (02-13-20 @ 17:42)      MICROBIOLOGY:    RADIOLOGY & ADDITIONAL STUDIES:

## 2020-02-16 NOTE — PROGRESS NOTE ADULT - ASSESSMENT
89 y/o female who is on home o2 , 2 L nc for his h/o severe pulmonary htn    > pna b/l   c/w zosyn and azithromycin   ID consulted    > acute on chronic hypoxic respiratory failure/ improving / likely due to pna / improved now    as above   Pulmonary htn,   Continue Bosanten    > Chronic atrial fibrillation, rate controlled, with PPM  will continue with coumadin , dig and b. blk.  elevated INR  hold Coumadin today  s/p vit k     > pt c/o rt foot pain too/5th metatarsal head # with avulsion on left  foot xray done per podiatry  which shows ? metatarsal fx   left foot splint, leg elevation, NWB  pain controlled  podiatry on board     >Anemia of chr dz  stable  transfuse PRN    PT eval- plan for sam once medically stable in 24-48 hrs

## 2020-02-16 NOTE — CHART NOTE - NSCHARTNOTEFT_GEN_A_CORE
Upon Nutritional Assessment by the Registered Dietitian your patient was determined to meet criteria / has evidence of the following diagnosis/diagnoses:          [ x]  Moderate Protein Calorie Malnutrition      Findings as based on:  •  Comprehensive nutrition assessment and consultation  •  Calorie counts (nutrient intake analysis)  •  Food acceptance and intake status from observations by staff  •  Follow up  •  Patient education  •  Intervention secondary to interdisciplinary rounds  •   concerns      Treatment:    The following diet has been recommended:      PROVIDER Section:     By signing this assessment you are acknowledging and agree with the diagnosis/diagnoses assigned by the Registered Dietitian    Comments:    RX: Add Ensure Enlive BID to optimize po intake and provide an additional 350kcal, 20g protein per serving

## 2020-02-16 NOTE — DIETITIAN INITIAL EVALUATION ADULT. - ADD RECOMMEND
RX: Add Ensure Enlive BID to optimize po intake and provide an additional 350kcal, 20g protein per serving. Continue assistance with meals. Monitor wts and labs.

## 2020-02-16 NOTE — DIETITIAN INITIAL EVALUATION ADULT. - OTHER INFO
89 year old woman with past medical history of Severe pulmonary hypertension, chronic HFpEF,  Bioprosthetic mitral valve, COPD, Atrial fibrillation, permanent pacemaker, moderate AS who was sent to the hospital due to low oxygen home, likely due to severe pulmonary hypertension. Pt reported poor po intake due to lack of appetite, consuming 10% of meals. Pt reported good po intake PTA. Pt reported no significant wt changes over the past year. Pt educated on importance of HBV protein. Aware of generalized 1+ edema and b/l arm 2+ edema. Last documented BM 2/10.

## 2020-02-16 NOTE — DIETITIAN INITIAL EVALUATION ADULT. - MALNUTRITION
NFPE performed. Mild muscle wasting at temples, clavicle, shoulder. Mild fat loss in orbital region, buccal pads. Moderate (acute)

## 2020-02-16 NOTE — PROGRESS NOTE ADULT - ASSESSMENT
Low grade temp with BP's running at times on the low side.  Significant anemia at this point without obvious active bleeding.  Fortunately O2 requirements remain low.      Plan:  1.Need to consider transfusion  2.Continue to titrate O2 as able  3.Patient is not a good candidate for invasive procedures  4.Cardiology input appreciated>in view of decrease in hematocrit would be very cautious about restarting coumadin

## 2020-02-16 NOTE — DIETITIAN INITIAL EVALUATION ADULT. - PERTINENT LABORATORY DATA
02-16 Na134 mmol/L<L> Glu 119 mg/dL<H> K+ 3.9 mmol/L Cr  1.83 mg/dL<H> BUN 64.0 mg/dL<H> Phos n/a   Alb 2.9 g/dL<L> PAB n/a

## 2020-02-16 NOTE — PROGRESS NOTE ADULT - SUBJECTIVE AND OBJECTIVE BOX
HARMAN MONTE  [id number]          Chief Complaint: follow up hypoxia/PHTN      Subjective: no cp/sob/palps, feeling better. Mild neck pain        acetaminophen   Tablet .. 650 milliGRAM(s) Oral every 6 hours PRN  albuterol/ipratropium for Nebulization 3 milliLiter(s) Nebulizer every 6 hours  atorvastatin 10 milliGRAM(s) Oral at bedtime  azithromycin  IVPB      azithromycin  IVPB 500 milliGRAM(s) IV Intermittent every 24 hours  bosentan 125 milliGRAM(s) Oral two times a day  digoxin     Tablet 0.125 milliGRAM(s) Oral daily  lactobacillus acidophilus 1 Tablet(s) Oral two times a day with meals  metoprolol tartrate 25 milliGRAM(s) Oral two times a day  nystatin Powder 1 Application(s) Topical two times a day  piperacillin/tazobactam IVPB.. 3.375 Gram(s) IV Intermittent every 8 hours  pregabalin 100 milliGRAM(s) Oral two times a day  sertraline 50 milliGRAM(s) Oral daily          Physical Exam:  T(C): 36.7 (02-16-20 @ 08:32), Max: 38.1 (02-15-20 @ 18:12)  HR: 70 (02-16-20 @ 08:43) (70 - 74)  BP: 98/61 (02-16-20 @ 08:32) (90/52 - 124/58)  RR: 19 (02-16-20 @ 08:32) (18 - 19)  SpO2: 96% (02-16-20 @ 08:43) (94% - 100%)  Wt(kg): --  General: elderly frail F Comfortable in NAD  HEENT: dry MM, pale sclera anicteric, Nasal cannula in place  Resp: CTA bilaterally  CVS: nl s1s2, rrr, no s3/JVD, II/VI harsh systolic murmur at the base  Abd: soft, NT, ND, BS+  Ext: No c/c/e, left ankle wrapped and in brace  Neuro A&O x2  Psych: Normal affect      I&O's Summary    15 Feb 2020 07:01  -  16 Feb 2020 07:00  --------------------------------------------------------  IN: 175 mL / OUT: 0 mL / NET: 175 mL          Labs:   16 Feb 2020 08:07    134    |  90     |  64.0   ----------------------------<  119    3.9     |  32.0   |  1.83     Ca    8.7        16 Feb 2020 08:07    TPro  6.1    /  Alb  2.9    /  TBili  0.4    /  DBili  x      /  AST  12     /  ALT  6      /  AlkPhos  38     16 Feb 2020 08:07                          7.0    5.29  )-----------( 126      ( 16 Feb 2020 08:07 )             22.7     PT/INR - ( 16 Feb 2020 08:07 )   PT: 15.4 sec;   INR: 1.35 ratio           Outpatient TTE (10/2019):  LVEF 55-60%  Right ventricular volume and pressure overload  Bovine mitral valve prosthesis present, mitral prosthesis regurgitation  Moderate aortic valve stenosis     ECG (2/11/2020): ventricular paced            Assessment and Plan:   89 year old woman with past medical history of Severe pulmonary hypertension (mixed etiology) on home oxygen (2L), chronic HFpEF,  Bioprosthetic mitral valve, COPD, Atrial fibrillation (on coumadin), permanent pacemaker, moderate AS who was sent to the hospital due to low oxygen ~ 70s at home, likely due to severe pulmonary hypertension. Patient denies exertional angina and troponin is negative.   -Patient now meets sepsis criteria with fever and low blood pressure. Primary team performing infectious workup with CT chest showing possible upper lobe infiltrates. On broad spectrum antibioitics  -Appears volume depleted and has had lasix held so far  -INR supratherapeutic and now down after vitamin L  -mental status stable, was initially with delirium  -neck pain musculoskeletal, management per primary team      Plan:  1. Continue off lasix, po hydration  2. Antibiotics for PNA  3. Restart coumadin  4. continue Bosentan for PHTN  5. Consider blood transfusion given continue drop in H/H, especially given hypoxia and PHTN. Will help symptomatically as well.  Will defer to primary team regarding further work up but would recommend conservative approach.   6. Supportive care, palliative has evaluated patient    Tomas Spicer MD

## 2020-02-17 NOTE — PROGRESS NOTE ADULT - SUBJECTIVE AND OBJECTIVE BOX
PULMONARY PROGRESS NOTE      FRANCISCO MONTE-[id number]    Patient is a 89y old  Female who presents with a chief complaint of low oxygenation (17 Feb 2020 10:15)  Severe pulmonary HTN on Trachleer  HFpEF  MVR - bioprosthetic; Moderate AS  Afib  Mild COPD  DNR-I      INTERVAL HPI/OVERNIGHT EVENTS:  Marginal - OK     MEDICATIONS  (STANDING):  albuterol/ipratropium for Nebulization 3 milliLiter(s) Nebulizer every 6 hours  atorvastatin 10 milliGRAM(s) Oral at bedtime  azithromycin  IVPB      azithromycin  IVPB 500 milliGRAM(s) IV Intermittent every 24 hours  bosentan 125 milliGRAM(s) Oral two times a day  digoxin     Tablet 0.125 milliGRAM(s) Oral daily  lactobacillus acidophilus 1 Tablet(s) Oral two times a day with meals  metoprolol tartrate 25 milliGRAM(s) Oral two times a day  nystatin Powder 1 Application(s) Topical two times a day  piperacillin/tazobactam IVPB.. 3.375 Gram(s) IV Intermittent every 8 hours  pregabalin 100 milliGRAM(s) Oral two times a day  sertraline 50 milliGRAM(s) Oral daily      MEDICATIONS  (PRN):  acetaminophen   Tablet .. 650 milliGRAM(s) Oral every 6 hours PRN Temp greater or equal to 38C (100.4F), Mild Pain (1 - 3)      Allergies    penicillin (Hives)    Intolerances        PAST MEDICAL & SURGICAL HISTORY:  History of COPD  H/O pulmonary hypertension  Atrial fibrillation  H/O prosthetic mitral valve: bioprosthetic.  Pacemaker      SOCIAL HISTORY  Smoking History:       REVIEW OF SYSTEMS:    CONSTITUTIONAL:  No distress    HEENT:  Eyes:  No diplopia or blurred vision. ENT:  No earache, sore throat or runny nose.    CARDIOVASCULAR:  No pressure, squeezing, tightness, heaviness or aching about the chest; no palpitations.    RESPIRATORY:  No  orthopnea. Mod SOBOE    GASTROINTESTINAL:  No nausea, vomiting or diarrhea.    GENITOURINARY:  No dysuria, frequency or urgency.    NEUROLOGIC:  No paresthesias, fasciculations, seizures or weakness.    PSYCHIATRIC:  No disorder of thought or mood.    Vital Signs Last 24 Hrs  T(C): 36.7 (17 Feb 2020 10:00), Max: 36.7 (17 Feb 2020 10:00)  T(F): 98.1 (17 Feb 2020 10:00), Max: 98.1 (17 Feb 2020 10:00)  HR: 70 (17 Feb 2020 10:00) (70 - 91)  BP: 106/61 (17 Feb 2020 10:00) (92/54 - 106/66)  BP(mean): --  RR: 16 (17 Feb 2020 10:00) (16 - 18)  SpO2: 98% (17 Feb 2020 10:00) (97% - 99%)    PHYSICAL EXAMINATION:    GENERAL: The patient is awake and alert in no apparent distress.     HEENT: Head is normocephalic and atraumatic. Extraocular muscles are intact. Mucous membranes are moist.    NECK: Supple.    LUNGS: Clear to auscultation without wheezing, rales or rhonchi; respirations unlabored    HEART: Regular rate and rhythm without murmur.    ABDOMEN: Soft, nontender, and nondistended.      EXTREMITIES: Without any cyanosis, clubbing, rash, lesions or edema.    NEUROLOGIC: Grossly intact.    LABS:                        7.0    5.29  )-----------( 126      ( 16 Feb 2020 08:07 )             22.7     02-16    134<L>  |  90<L>  |  64.0<H>  ----------------------------<  119<H>  3.9   |  32.0<H>  |  1.83<H>    Ca    8.7      16 Feb 2020 08:07    TPro  6.1<L>  /  Alb  2.9<L>  /  TBili  0.4  /  DBili  x   /  AST  12  /  ALT  6   /  AlkPhos  38<L>  02-16    PT/INR - ( 16 Feb 2020 08:07 )   PT: 15.4 sec;   INR: 1.35 ratio             RADIOLOGY & ADDITIONAL STUDIES:    CT Chest on 2/14/20  IMPRESSION:     Mild patchy nodular opacities bilaterally as above which are concerning for infection. Underlying true nodules cannot be excluded and follow-up following treatment is recommended. Mild mosaic perfusion. Cardiomegaly. Minimal left pleural fluid. Mild bilateral interlobular septal thickening which may be related to cardiac dysfunction. Please correlate clinically.    Gallstones. Probable partially imaged pharyngean cap in the gallbladder fundus although this is very limited in evaluation. Recommend nonemergent right upper quadrant ultrasound to evaluate the gallbladder fundus to exclude pathology.      MARY YBARRA M.D., ATTENDING RADIOLOGIST        -

## 2020-02-17 NOTE — PROGRESS NOTE ADULT - SUBJECTIVE AND OBJECTIVE BOX
Patient is a 89y old  Female who presents with a chief complaint of low oxygenation (12 Feb 2020 14:17)      HPI:  88 y/o female who is on home o2 , 2 L nc for his h/o severe pulmonary htn was brought in as pt's daughter noticed that for past 1 week pt's oxygenation runs low 70's ? and she was in need of increase oxygen than her baseline. pt. has some cough, no fever. no sick contact. pt. walks with walker but denies exertional dyspnea, no cp. no syncopy. pt.sleeps with head for long time and that did not change. no abd. pain. no n /v/d. pt. has noticed some left foot area pain and swelling, some warmth for past few days. As per pt. she has been ambulatory and not sure how pain and swelling in left foot started, there has been no fall or trauma to left foot as per history. Pt's daughter called her cardiologist and she was instructed to go to the ER.   pt. has past medical history of Severe pulmonary hypertension (mixed etiology) on home oxygen (2L), HFpEF,  Bioprosthetic mitral valve, COPD ? Atrial fibrillation (on coumadin), permanent pacemaker. (11 Feb 2020 21:35)    Podiatry consulted for Left foot pain. Pt states she fell down last week but unsure if she hit her Left foot. Left posterior splint clean, dry and intact. now complains of right foot/ankle pain      PAST MEDICAL & SURGICAL HISTORY:  History of COPD  H/O pulmonary hypertension  Atrial fibrillation  H/O prosthetic mitral valve: bioprosthetic.  Pacemaker    Allergies: Allergy Status Unknown    Medications: albuterol/ipratropium for Nebulization 3 milliLiter(s) Nebulizer every 6 hours  atorvastatin 10 milliGRAM(s) Oral at bedtime  bosentan 125 milliGRAM(s) Oral two times a day  digoxin     Tablet 0.125 milliGRAM(s) Oral daily  furosemide   Injectable 40 milliGRAM(s) IV Push daily  lactobacillus acidophilus 1 Tablet(s) Oral two times a day with meals  metoprolol tartrate 25 milliGRAM(s) Oral two times a day  nystatin Powder 1 Application(s) Topical two times a day  pregabalin 100 milliGRAM(s) Oral two times a day  sertraline 50 milliGRAM(s) Oral daily  warfarin 4 milliGRAM(s) Oral daily    FH:FAMILY HISTORY:  FH: heart disease: mother    SH:     Vital Signs Last 24 Hrs  T(C): 36.7 (17 Feb 2020 10:00), Max: 36.7 (17 Feb 2020 10:00)  T(F): 98.1 (17 Feb 2020 10:00), Max: 98.1 (17 Feb 2020 10:00)  HR: 70 (17 Feb 2020 10:00) (70 - 91)  BP: 106/61 (17 Feb 2020 10:00) (92/54 - 106/66)  BP(mean): --  RR: 16 (17 Feb 2020 10:00) (16 - 18)  SpO2: 98% (17 Feb 2020 10:00) (97% - 99%)    LABS                        7.3    5.58  )-----------( 145      ( 17 Feb 2020 11:36 )             24.5     WBC Count: 5.58 K/uL (02-17 @ 11:36)  WBC Count: 5.29 K/uL (02-16 @ 08:07)  WBC Count: 8.42 K/uL (02-15 @ 08:08)  WBC Count: 7.98 K/uL (02-13 @ 17:43)    02-17    133<L>  |  91<L>  |  58.0<H>  ----------------------------<  118<H>  4.3   |  34.0<H>  |  1.52<H>    Ca    8.6      17 Feb 2020 11:36    TPro  6.3<L>  /  Alb  3.0<L>  /  TBili  0.4  /  DBili  x   /  AST  15  /  ALT  7   /  AlkPhos  42  02-17      ROS  REVIEW OF SYSTEMS:    CONSTITUTIONAL: No fever, weight loss, or fatigue  EYES: No eye pain, visual disturbances, or discharge  ENMT:  No difficulty hearing, tinnitus, vertigo; No sinus or throat pain  NECK: No pain or stiffness  BREASTS: No pain, masses, or nipple discharge  RESPIRATORY: No cough, wheezing, chills or hemoptysis; No shortness of breath  CARDIOVASCULAR: No chest pain, palpitations, dizziness, or leg swelling  GASTROINTESTINAL: No abdominal or epigastric pain. No nausea, vomiting, or hematemesis; No diarrhea or constipation. No melena or hematochezia.  GENITOURINARY: No dysuria, frequency, hematuria, or incontinence  NEUROLOGICAL: No headaches, memory loss, loss of strength, numbness, or tremors  SKIN: No itching, burning, rashes, or lesions   LYMPH NODES: No enlarged glands  ENDOCRINE: No heat or cold intolerance; No hair loss  MUSCULOSKELETAL: Left foot pain  PSYCHIATRIC: No depression, anxiety, mood swings, or difficulty sleeping  HEME/LYMPH: No easy bruising, or bleeding gums  ALLERY AND IMMUNOLOGIC: No hives or eczema      PHYSICAL EXAM  GEN: HARMAN MONTE is a pleasant well-nourished, well developed 89y Female in no acute distress, alert awake, and oriented to person, place and time.   LE Focused:    Vasc:  DP/PT pulses lightly palpable, cap refill <5 secs  Derm: No interdigital macerations or open lesions noted, erythema noted to Left 5th MPJ area  Neuro: Light touch sensation grossly intact  MSK: Pain on palpation to 5th metatarsal head area, pain on palpation to Left lateral malleolus, able to wiggle toes without pain. Left anterior ankle pain along extensor tendons during passive plantarflexion/dorsiflexion, no pain on palpation at right 5th met head where questionable fracture was read on x-ray    Imaging:   < from: Xray Foot AP + Lateral + Oblique, Left (02.11.20 @ 16:14) >   EXAM:  FOOT-LEFT                          PROCEDURE DATE:  02/11/2020          INTERPRETATION:  Left foot. 3 views. Patient has local swelling.    Arterial calcifications are noted.    There is a wire suture along the superior surface of the firstmetatarsal.    There is fairly significant first MTP degeneration with outward angulation at the first MTP joint. This appears chronic.    No bone destruction or fracture is evident.    IMPRESSION: As above.    < end of copied text >      < from: CT Foot No Cont, Left (02.12.20 @ 11:33) >   EXAM:  CT FOOT ONLY LT                         EXAM:  CT ANKLE ONLY LT                          PROCEDURE DATE:  02/12/2020          INTERPRETATION:  HISTORY: Left foot and ankle pain. Swelling.    Helical CT imaging of the left foot and ankle wasperformed without intravenous contrast. Sagittal and coronal reformats were provided. 3-D reformats were performed on a separate workstation.    Correlation is made with radiographs from February 11, 2020.    Findings:    LEFT ANKLE: There is no evidence of acute fracture or dislocation. There is well-corticated ossific density at the anterior aspect of the lateral malleolus consistent with remote avulsive injury. There is moderate talonavicular joint arthrosis with a chronic dorsal capsular avulsion. The subtalar and tibiotalar joint spaces are intact. There is a plantar calcaneal spur.     There is atherosclerotic disease. There is no fatty atrophy of the musculature.    LEFT FOOT: There is an acute fracture at the head neck junction of thefifth metatarsal with mild dorsal displacement acute capsular avulsions are also seen along the plantar aspect of the fifth metatarsal phalangeal joint along the plantar base of the fifth proximal phalanx. No additional fractures are demonstrated.    There is severe first metatarsophalangeal and hallux sesamoid joint arthrosis. There is medial subluxation of the first toe and the sesamoid bones relative to the first metatarsal. There is a cerclage wire within the proximal shaft of the first metatarsal. There is severe arthrosis at the second and third tarsometatarsal articulations.    IMPRESSION:    LEFT ANKLE: No evidence of acute fracture or dislocation.    LEFT FOOT: Acute fracture the head neck junction of the fifth metatarsal. Acute capsular avulsion along the plantar base of the fifth proximal phalanx of the metatarsal phalangeal joint.      < end of copied text >    < from: Xray Foot AP + Lateral, Right (02.14.20 @ 09:44) >     EXAM:  FOOT 2VIEWS RT                          PROCEDURE DATE:  02/14/2020          INTERPRETATION:  Right foot    HISTORY: Pain near ankle      Three views of the right foot show questionable fracture of the head of the fifth metatarsal bone in one view. The remaining bony structures and joint spaces are maintained.    IMPRESSION: Questionable fracture, fifth metatarsal. Clinical correlation is suggested.        Thank you for this referral.                HANY STOUT M.D., ATTENDING RADIOLOGIST  This document has been electronically signed. Feb 14 2020 11:15AM                  < end of copied text >      A:   Left 5th metatarsal fracture  Questionable R 5th metatarsal head fracture    P:  Patient evaluated, chart reviewed  X-rays reviewed as above  CT reviewed  Posterior splint to be kept clean, dry and intact  Pt to be non-WB to Left foot   WB as tolerated to R foot in surgical shoe - R 5th met fracture unlikely   Recommend elevation to LLE while in bed. Pillow to be placed proximal to Left heel.   Pt stable for discharge from podiatry standpoint. Pt to follow up with Dr. Sullivan/Jamilah upon discharge  Discussed with Dr. Askew

## 2020-02-17 NOTE — PROGRESS NOTE ADULT - ASSESSMENT
89 y/o female who is on home o2 , 2 L nc for his h/o severe pulmonary htn was brought in as pt's daughter noticed that for past 1 week pt's oxygenation runs low 70's ? and she was in need of increase oxygen than her baseline. pt. has some cough, no fever. no sick contact. pt. walks with walker but denies exertional dyspnea, no cp. no syncopy.   no abd. pain. no n /v/d. pt. has noticed some left foot area pain and swelling, some warmth for past few days.PT WITH FALL AT HOME AS PER DAUGHTER AND HAD FOOT FRACTURE .    pt. has past medical history of Severe pulmonary hypertension (mixed etiology) on home oxygen (2L), HFpEF,  Bioprosthetic mitral valve, COPD ? Atrial fibrillation (on coumadin), permanent pacemaker.   AFEBRILE AWAKE AND ALERT      ? PNEUMONIA    HAS BEEN ON ZOSYN DAY 5   WOULD COMPLETE 7 DAYS  DIFFICULT TO KNOW IF SUPERIMPOSED PNEUMONIA  OVERALL IMPROVED WILL FOLLOWUP     SPOKE TO DAUGHTER AT BEDSIDE

## 2020-02-17 NOTE — PROGRESS NOTE ADULT - PROBLEM SELECTOR PLAN 4
Patient doing well, tolerating nasal canula.  Hx of severe PH functionally debilitated, generally homebound. Home hospice services could be considered. However, patient on Tracleer which hospice may not cover cost of this medication. Would need to discuss with hospice.  MOLST - DNR/I

## 2020-02-17 NOTE — PROGRESS NOTE ADULT - SUBJECTIVE AND OBJECTIVE BOX
INFECTIOUS DISEASES AND INTERNAL MEDICINE at [facility]  =======================================================  Jermaine Craig MD  Diplomates American Board of Internal Medicine and Infectious Diseases  Telephone [phone]  Fax            [phone]  =======================================================    HARMAN MONTE [id number]    Follow up: ?PNEUMONIA    Allergies:  penicillin (Hives)      Medications:  acetaminophen   Tablet .. 650 milliGRAM(s) Oral every 6 hours PRN  albuterol/ipratropium for Nebulization 3 milliLiter(s) Nebulizer every 6 hours  atorvastatin 10 milliGRAM(s) Oral at bedtime  azithromycin  IVPB      azithromycin  IVPB 500 milliGRAM(s) IV Intermittent every 24 hours  bosentan 125 milliGRAM(s) Oral two times a day  digoxin     Tablet 0.125 milliGRAM(s) Oral daily  lactobacillus acidophilus 1 Tablet(s) Oral two times a day with meals  metoprolol tartrate 25 milliGRAM(s) Oral two times a day  nystatin Powder 1 Application(s) Topical two times a day  piperacillin/tazobactam IVPB.. 3.375 Gram(s) IV Intermittent every 8 hours  pregabalin 100 milliGRAM(s) Oral two times a day  sertraline 50 milliGRAM(s) Oral daily    SOCIAL       FAMILY   FAMILY HISTORY:  FH: heart disease: mother    REVIEW OF SYSTEMS:  CONSTITUTIONAL:  No Fever or chills  HEENT:   No diplopia or blurred vision.  No earache, sore throat or runny nose.  CARDIOVASCULAR:  No pressure, squeezing, strangling, tightness, heaviness or aching about the chest, neck, axilla or epigastrium.  RESPIRATORY:   POS  cough, shortness of breath,    GASTROINTESTINAL:  No nausea, vomiting or diarrhea.  GENITOURINARY:  No dysuria, frequency or urgency. No Blood in urine  MUSCULOSKELETAL:   AS PER HPI  SKIN:  No change in skin, hair or nails.  NEUROLOGIC: AS PER HPI           Physical Exam:  ICU Vital Signs Last 24 Hrs  T(C): 36.7 (17 Feb 2020 10:00), Max: 36.7 (17 Feb 2020 10:00)  T(F): 98.1 (17 Feb 2020 10:00), Max: 98.1 (17 Feb 2020 10:00)  HR: 70 (17 Feb 2020 15:18) (70 - 91)  BP: 106/61 (17 Feb 2020 10:00) (92/54 - 106/66)  BP(mean): --  ABP: --  ABP(mean): --  RR: 16 (17 Feb 2020 10:00) (16 - 18)  SpO2: 95% (17 Feb 2020 15:18) (95% - 98%)    GEN: NAD,   HEENT: normocephalic and atraumatic. EOMI. KATRIN.    NECK: Supple. No carotid bruits.  No lymphadenopathy or thyromegaly.  LUNGS: Clear to auscultation.  HEART: Regular rate and rhythm without murmur.  ABDOMEN: Soft, nontender, and nondistended.  Positive bowel sounds.    : No CVA tenderness  EXTREMITIES: Without any cyanosis, clubbing, rash, lesions or edema.  MSK: no joint swelling  NEUROLOGIC: Cranial nerves II through XII are grossly intact.  PSYCHIATRIC: Appropriate affect .  SKIN: No ulceration or induration present.        Labs:  02-17    133<L>  |  91<L>  |  58.0<H>  ----------------------------<  118<H>  4.3   |  34.0<H>  |  1.52<H>    Ca    8.6      17 Feb 2020 11:36    TPro  6.3<L>  /  Alb  3.0<L>  /  TBili  0.4  /  DBili  x   /  AST  15  /  ALT  7   /  AlkPhos  42  02-17                          7.3    5.58  )-----------( 145      ( 17 Feb 2020 11:36 )             24.5       PT/INR - ( 17 Feb 2020 11:36 )   PT: 12.7 sec;   INR: 1.12 ratio             LIVER FUNCTIONS - ( 17 Feb 2020 11:36 )  Alb: 3.0 g/dL / Pro: 6.3 g/dL / ALK PHOS: 42 U/L / ALT: 7 U/L / AST: 15 U/L / GGT: x               CAPILLARY BLOOD GLUCOSE            RECENT CULTURES:  02-13 @ 17:46          NotDetec  02-13 @ 17:42 .Blood     No growth at 48 hours        02-11 @ 23:12 .Blood     No growth at 5 days.        02-11 @ 14:49 .Blood     No growth at 5 days.        02-11 @ 14:48 .Blood     No growth at 5 days.

## 2020-02-17 NOTE — PROGRESS NOTE ADULT - ASSESSMENT
Assess    Severe pulmonary HTN - likely related to valvular HD  Advanced age  Poor outlook    Rec    02  Differ AC given anemia  Comfort measures

## 2020-02-17 NOTE — PROGRESS NOTE ADULT - SUBJECTIVE AND OBJECTIVE BOX
CC:  follow up GOC    INTERVAL HPI/OVERNIGHT EVENTS:  on nasal canula    PRESENT SYMPTOMS: SOURCE:  Patient/Family/Team    PAIN SCALE:  0 = none  1 = mild   2 = moderate  3 = severe    Pain: denies    Dyspnea:  [ ] YES [ x] NO  Anxiety:  [ ] YES [ x] NO  Fatigue: [x ] YES [ ] NO  Nausea: [ ] YES [ x] NO  Loss of Appetite: [ ] YES [ x] NO  Other symptoms: __________    MEDICATIONS  (STANDING):  albuterol/ipratropium for Nebulization 3 milliLiter(s) Nebulizer every 6 hours  atorvastatin 10 milliGRAM(s) Oral at bedtime  azithromycin  IVPB      azithromycin  IVPB 500 milliGRAM(s) IV Intermittent every 24 hours  bosentan 125 milliGRAM(s) Oral two times a day  digoxin     Tablet 0.125 milliGRAM(s) Oral daily  lactobacillus acidophilus 1 Tablet(s) Oral two times a day with meals  metoprolol tartrate 25 milliGRAM(s) Oral two times a day  nystatin Powder 1 Application(s) Topical two times a day  piperacillin/tazobactam IVPB.. 3.375 Gram(s) IV Intermittent every 8 hours  pregabalin 100 milliGRAM(s) Oral two times a day  sertraline 50 milliGRAM(s) Oral daily    MEDICATIONS  (PRN):  acetaminophen   Tablet .. 650 milliGRAM(s) Oral every 6 hours PRN Temp greater or equal to 38C (100.4F), Mild Pain (1 - 3)      Allergies    penicillin (Hives)    Intolerances    Karnofsky Performance Score/Palliative Performance Status Version 2:    40  %    Vital Signs Last 24 Hrs  T(C): 36.7 (17 Feb 2020 10:00), Max: 36.7 (17 Feb 2020 10:00)  T(F): 98.1 (17 Feb 2020 10:00), Max: 98.1 (17 Feb 2020 10:00)  HR: 70 (17 Feb 2020 10:00) (70 - 91)  BP: 106/61 (17 Feb 2020 10:00) (92/54 - 106/66)  BP(mean): --  RR: 16 (17 Feb 2020 10:00) (16 - 18)  SpO2: 98% (17 Feb 2020 10:00) (97% - 99%)    PHYSICAL EXAM:    General: elderly woman, awake alert, NAD - sitting in bed eating breakfast    HEENT: [ x] normal  [ ] dry mouth  [ ] ET tube/trach    Lungs: [x ] comfortable [ ] tachypnea/labored breathing  [ ] excessive secretions    CV: [x ] normal  [ ] tachycardia    GI: [ x] normal  [ ] distended  [ ] tender  [ ] no BS               [ ] PEG/NG/OG tube    : [x ] normal  [ ] incontinent  [ ] oliguria/anuria  [ ] [place]    MSK: [ ] normal  [ x] weakness  [ ] edema             [ ] ambulatory  [x] bedbound/wheelchair bound    Skin: [ ] normal  [ ] pressure ulcers- Stage_____  [x ] no rash    LABS:                        7.0    5.29  )-----------( 126      ( 16 Feb 2020 08:07 )             22.7     02-16    134<L>  |  90<L>  |  64.0<H>  ----------------------------<  119<H>  3.9   |  32.0<H>  |  1.83<H>    Ca    8.7      16 Feb 2020 08:07    TPro  6.1<L>  /  Alb  2.9<L>  /  TBili  0.4  /  DBili  x   /  AST  12  /  ALT  6   /  AlkPhos  38<L>  02-16    PT/INR - ( 16 Feb 2020 08:07 )   PT: 15.4 sec;   INR: 1.35 ratio         I&O's Summary      RADIOLOGY & ADDITIONAL STUDIES:  < from: CT Chest No Cont (02.14.20 @ 16:36) >  BONES: Degenerative changes.    IMPRESSION:     Mild patchy nodular opacities bilaterally as above which are concerning for infection. Underlying true nodules cannot be excluded and follow-up following treatment is recommended. Mild mosaic perfusion. Cardiomegaly. Minimal left pleural fluid. Mild bilateral interlobular septal thickening which may be related to cardiac dysfunction. Please correlate clinically.    Gallstones. Probable partially imaged pharyngean cap in the gallbladder fundus although this is very limited in evaluation. Recommend nonemergent right upper quadrant ultrasound to evaluate the gallbladder fundus to exclude pathology.        < end of copied text >      ADVANCE DIRECTIVES:  [x ] YES [ ] NO    DNR: [x ] YES [ ] NO      Date Completed:                    MOLST [x ] YES [ ] NO   Date Completed: 2/14/2020    Thank you for the opportunity to assist with the care of this patient.   [facility] [phone].

## 2020-02-17 NOTE — PROGRESS NOTE ADULT - ASSESSMENT
Assessment:  89 year old woman with past medical history of Severe pulmonary hypertension (mixed etiology) on home oxygen (2L), HFpEF,  Bioprosthetic mitral valve, COPD, Atrial fibrillation (on coumadin), permanent pacemaker, who was sent to the hospital due to low oxygen ~ 70s at home, likely due to severe pulmonary hypertension. Patient denies exertional angina and troponin is negative. Patient had low blood pressure and fever, concerning for sepsis, possible due to pneumonia.     The patient appears better today, denies dyspnea or angina. Has better appetite.     Recommendations:  [] Hypoxemia: Likely secondary to history of severe pulmonary hypertension. Discussed with outpatient cardiologist and no need for repeat echo this admission. Echo from October with LVEF 55-60% and right ventricular volume and pressure overload. Would not restart Lasix during this hospitalization, can follow-up with outpatient cardiologist within 1 week to further discuss. Encourage PO hydration. Continue Bosentan. Consider packed red blood cell transfusion, hemoglobin dropped to 7 today.   [] Atrial fibrillation: Monitor INR daily and dose coumadin   [] Left ankle pain/erythemia workup per primary team, podiatry consulted, patient with possible left fifth metatarsal fracture   [] Pneumonia management per primary team/antibiotics  [] Palliative care evaluated patient as well    Thank you for the consult. We will follow along.    Skylar Barahona MD  Cardiology, [facility]

## 2020-02-17 NOTE — PROGRESS NOTE ADULT - SUBJECTIVE AND OBJECTIVE BOX
cc: pna, fever     INTERVAL HPI/ OVERNIGHT EVENTS: pt seen and eval. comfortable. in no acute distress., no fever or chills. c/o neck pain , no rigidity     Vital Signs Last 24 Hrs  T(C): 36.7 (17 Feb 2020 10:00), Max: 36.7 (17 Feb 2020 10:00)  T(F): 98.1 (17 Feb 2020 10:00), Max: 98.1 (17 Feb 2020 10:00)  HR: 70 (17 Feb 2020 15:18) (70 - 91)  BP: 106/61 (17 Feb 2020 10:00) (92/54 - 106/66)  BP(mean): --  RR: 16 (17 Feb 2020 10:00) (16 - 18)  SpO2: 95% (17 Feb 2020 15:18) (95% - 98%)    PHYSICAL EXAM-  General: An elderly  female sitting up in bed  not in distress.  HEENT: AT, NC. PERRL. intact EOM. no throat erythema or exudate.   Chest: few scattered basal crackles bilaterally, no wheeze appreciated.  Heart: S1,S2. RRR. + systolic heart murmur. ankle edema left more than right noted.  Abdomen: soft. non-tender. non-distended. + BS.   Ext: no calf tenderness. ROM at left ankle mildly diminished due to pain. edema , warmth and mild erythema noted over dorsal aspect and around ankle, no open wound, rt. ankle / foot without sig. swelling, no pain.   Neuro: AAO x3. no focal weakness. no speech disorder. CN intact.   Skin: no diaphoresis, no cyanosis, other left foot skin findings as in ext. exam.   psych : pleasant, no agitation. mood ok                                     7.3    5.58  )-----------( 145      ( 17 Feb 2020 11:36 )             24.5   02-17    133<L>  |  91<L>  |  58.0<H>  ----------------------------<  118<H>  4.3   |  34.0<H>  |  1.52<H>    Ca    8.6      17 Feb 2020 11:36    TPro  6.3<L>  /  Alb  3.0<L>  /  TBili  0.4  /  DBili  x   /  AST  15  /  ALT  7   /  AlkPhos  42  02-17 cc: pna, fever     INTERVAL HPI/ OVERNIGHT EVENTS: pt seen and eval. comfortable. in no acute distress., no fever or chills.     Vital Signs Last 24 Hrs  T(C): 36.7 (17 Feb 2020 10:00), Max: 36.7 (17 Feb 2020 10:00)  T(F): 98.1 (17 Feb 2020 10:00), Max: 98.1 (17 Feb 2020 10:00)  HR: 70 (17 Feb 2020 15:18) (70 - 91)  BP: 106/61 (17 Feb 2020 10:00) (92/54 - 106/66)  BP(mean): --  RR: 16 (17 Feb 2020 10:00) (16 - 18)  SpO2: 95% (17 Feb 2020 15:18) (95% - 98%)    PHYSICAL EXAM-  General: An elderly  female sitting up in bed  not in distress.  HEENT: AT, NC. PERRL. intact EOM. no throat erythema or exudate.   Chest: few scattered basal crackles bilaterally, no wheeze appreciated.  Heart: S1,S2. RRR. + systolic heart murmur. ankle edema left more than right noted.  Abdomen: soft. non-tender. non-distended. + BS.   Ext: no calf tenderness. ROM at left ankle mildly diminished due to pain. edema , warmth and mild erythema noted over dorsal aspect and around ankle, no open wound, rt. ankle / foot without sig. swelling, no pain.   Neuro: AAO x3. no focal weakness. no speech disorder. CN intact.   Skin: no diaphoresis, no cyanosis, other left foot skin findings as in ext. exam.   psych : pleasant, no agitation. mood ok                                     7.3    5.58  )-----------( 145      ( 17 Feb 2020 11:36 )             24.5   02-17    133<L>  |  91<L>  |  58.0<H>  ----------------------------<  118<H>  4.3   |  34.0<H>  |  1.52<H>    Ca    8.6      17 Feb 2020 11:36    TPro  6.3<L>  /  Alb  3.0<L>  /  TBili  0.4  /  DBili  x   /  AST  15  /  ALT  7   /  AlkPhos  42  02-17    < from: CT Cervical Spine No Cont (02.16.20 @ 21:55) >  IMPRESSION:   No fracture or dislocation. Right upper lobe groundglass opacification measuring 1.6 cm. Nonemergent dedicated chest CT is recommended. Preliminary report provided by [facility] ERLINDA RAMOS M.D.;[facility] RADIOLOGIST.      < end of copied text >

## 2020-02-17 NOTE — PROGRESS NOTE ADULT - SUBJECTIVE AND OBJECTIVE BOX
HARMAN MONTE  [id number]        Chief Complaint: Follow up hypoxemia/volume overload/sepsis      Interval History: The patient reports feeling better today. Denies dyspnea.      Tele: no events        Current Meds:   acetaminophen   Tablet .. 650 milliGRAM(s) Oral every 6 hours PRN  albuterol/ipratropium for Nebulization 3 milliLiter(s) Nebulizer every 6 hours  atorvastatin 10 milliGRAM(s) Oral at bedtime  azithromycin  IVPB      azithromycin  IVPB 500 milliGRAM(s) IV Intermittent every 24 hours  bosentan 125 milliGRAM(s) Oral two times a day  digoxin     Tablet 0.125 milliGRAM(s) Oral daily  lactobacillus acidophilus 1 Tablet(s) Oral two times a day with meals  metoprolol tartrate 25 milliGRAM(s) Oral two times a day  nystatin Powder 1 Application(s) Topical two times a day  piperacillin/tazobactam IVPB.. 3.375 Gram(s) IV Intermittent every 8 hours  pregabalin 100 milliGRAM(s) Oral two times a day  sertraline 50 milliGRAM(s) Oral daily      Objective:     Vital Signs:   T(C): 36.7 (02-17-20 @ 10:00), Max: 36.7 (02-17-20 @ 10:00)  HR: 70 (02-17-20 @ 10:00) (70 - 91)  BP: 106/61 (02-17-20 @ 10:00) (92/54 - 106/66)  RR: 16 (02-17-20 @ 10:00) (16 - 18)  SpO2: 98% (02-17-20 @ 10:00) (97% - 99%)  Wt(kg): --    Physical Exam:   General: elderly woman, sitting upright in bed, no distress  HEENT: NCAT, mmm, EOMI  Neck: supple  CVS: JVP ~ 9 cm H20, RRR, s1, s2, no murmurs  Chest: unlabored respirations, CTAB  Abdomen: non-distended  Extremities: no lower extremity edema b/l, left ankle wrapped  Neuro: A&O x3  Psych: Normal affect      Labs:   16 Feb 2020 08:07    134    |  90     |  64.0   ----------------------------<  119    3.9     |  32.0   |  1.83     Ca    8.7        16 Feb 2020 08:07    TPro  6.1    /  Alb  2.9    /  TBili  0.4    /  DBili  x      /  AST  12     /  ALT  6      /  AlkPhos  38     16 Feb 2020 08:07                          7.0    5.29  )-----------( 126      ( 16 Feb 2020 08:07 )             22.7     PT/INR - ( 16 Feb 2020 08:07 )   PT: 15.4 sec;   INR: 1.35 ratio               Outpatient TTE (10/2019):  LVEF 55-60%  Right ventricular volume and pressure overload  Bovine mitral valve prosthesis present, mitral prosthesis regurgitation  Moderate aortic valve stenosis     ECG (2/11/2020): ventricular paced

## 2020-02-18 NOTE — PROGRESS NOTE ADULT - SUBJECTIVE AND OBJECTIVE BOX
CC:  follow up GOC  INTERVAL HPI/OVERNIGHT EVENTS:    PRESENT SYMPTOMS: SOURCE:  Patient/Family/Team    PAIN SCALE:  0 = none  1 = mild   2 = moderate  3 = severe    Pain: denies    Dyspnea:  [ ] YES [x ] NO  Anxiety:  [ ] YES [ ]x NO  Fatigue: [x ] YES [ ] NO  Nausea: [ ] YES [x ] NO  Loss of Appetite: [ ] YES [ x] NO  Other symptoms: __________    MEDICATIONS  (STANDING):  albuterol/ipratropium for Nebulization 3 milliLiter(s) Nebulizer every 6 hours  atorvastatin 10 milliGRAM(s) Oral at bedtime  azithromycin  IVPB      azithromycin  IVPB 500 milliGRAM(s) IV Intermittent every 24 hours  bosentan 125 milliGRAM(s) Oral two times a day  digoxin     Tablet 0.125 milliGRAM(s) Oral daily  lactobacillus acidophilus 1 Tablet(s) Oral two times a day with meals  metoprolol tartrate 25 milliGRAM(s) Oral two times a day  nystatin Powder 1 Application(s) Topical two times a day  piperacillin/tazobactam IVPB.. 3.375 Gram(s) IV Intermittent every 8 hours  pregabalin 100 milliGRAM(s) Oral two times a day  sertraline 50 milliGRAM(s) Oral daily    MEDICATIONS  (PRN):  acetaminophen   Tablet .. 650 milliGRAM(s) Oral every 6 hours PRN Temp greater or equal to 38C (100.4F), Mild Pain (1 - 3)      Allergies    penicillin (Hives)    Intolerances      Karnofsky Performance Score/Palliative Performance Status Version 2: 40    %    Vital Signs Last 24 Hrs  T(C): 36.2 (18 Feb 2020 08:45), Max: 37.1 (17 Feb 2020 18:18)  T(F): 97.1 (18 Feb 2020 08:45), Max: 98.7 (17 Feb 2020 18:18)  HR: 72 (18 Feb 2020 08:45) (70 - 78)  BP: 103/52 (18 Feb 2020 08:45) (100/53 - 103/52)  BP(mean): --  RR: 19 (18 Feb 2020 08:45) (18 - 19)  SpO2: 93% (18 Feb 2020 08:45) (93% - 97%)    PHYSICAL EXAM:    General: awake alert NAD    HEENT: [x ] normal  [ ] dry mouth  [ ] ET tube/trach    Lungs: [x ] comfortable - nasal canula  CV: [x ] normal  [ ] tachycardia    GI: [x ] normal  [ ] distended  [ ] tender  [ ] no BS               [ ] PEG/NG/OG tube    : [ ]x normal  [ ] incontinent  [ ] oliguria/anuria  [ ] [place]    MSK: [ ] normal  [ ] weakness  [ ] edema             [ ] ambulatory  [ x] bedbound/wheelchair bound    Skin: [ ] normal  [ ] pressure ulcers- Stage_____  [ x] no rash    LABS:                        7.2    4.25  )-----------( 137      ( 18 Feb 2020 08:58 )             23.7     02-18    134<L>  |  90<L>  |  49.0<H>  ----------------------------<  84  4.0   |  31.0<H>  |  1.62<H>    Ca    8.7      18 Feb 2020 08:58    TPro  6.3<L>  /  Alb  3.0<L>  /  TBili  0.4  /  DBili  x   /  AST  15  /  ALT  7   /  AlkPhos  42  02-17    PT/INR - ( 18 Feb 2020 08:58 )   PT: 13.1 sec;   INR: 1.15 ratio           Thank you for the opportunity to assist with the care of this patient.   [facility] [phone].

## 2020-02-18 NOTE — PROGRESS NOTE ADULT - SUBJECTIVE AND OBJECTIVE BOX
HARMAN MONTE  [id number]      Chief Complaint: follow up hypoxia/PHTN      Subjective: no cp/sob/palps, feeling better again today      acetaminophen   Tablet .. 650 milliGRAM(s) Oral every 6 hours PRN  albuterol/ipratropium for Nebulization 3 milliLiter(s) Nebulizer every 6 hours  atorvastatin 10 milliGRAM(s) Oral at bedtime  azithromycin  IVPB      azithromycin  IVPB 500 milliGRAM(s) IV Intermittent every 24 hours  bosentan 125 milliGRAM(s) Oral two times a day  digoxin     Tablet 0.125 milliGRAM(s) Oral daily  lactobacillus acidophilus 1 Tablet(s) Oral two times a day with meals  metoprolol tartrate 25 milliGRAM(s) Oral two times a day  nystatin Powder 1 Application(s) Topical two times a day  piperacillin/tazobactam IVPB.. 3.375 Gram(s) IV Intermittent every 8 hours  pregabalin 100 milliGRAM(s) Oral two times a day  sertraline 50 milliGRAM(s) Oral daily          Physical Exam:  T(C): 36.2 (02-18-20 @ 08:45), Max: 37.1 (02-17-20 @ 18:18)  HR: 72 (02-18-20 @ 08:45) (70 - 78)  BP: 103/52 (02-18-20 @ 08:45) (100/53 - 103/52)  RR: 19 (02-18-20 @ 08:45) (18 - 19)  SpO2: 93% (02-18-20 @ 08:45) (93% - 97%)  Wt(kg): --  General: elderly frail F Comfortable in NAD  HEENT: dry MM, pale sclera anicteric, Nasal cannula in place  Resp: CTA bilaterally  CVS: nl s1s2, rrr, no s3/JVD, II/VI harsh systolic murmur at the base  Abd: soft, NT, ND, BS+  Ext: No c/c/e, left ankle wrapped and in brace  Neuro A&O x2  Psych: Normal affect          Labs:   18 Feb 2020 08:58    134    |  90     |  49.0   ----------------------------<  84     4.0     |  31.0   |  1.62     Ca    8.7        18 Feb 2020 08:58    TPro  6.3    /  Alb  3.0    /  TBili  0.4    /  DBili  x      /  AST  15     /  ALT  7      /  AlkPhos  42     17 Feb 2020 11:36                          7.2    4.25  )-----------( 137      ( 18 Feb 2020 08:58 )             23.7     PT/INR - ( 18 Feb 2020 08:58 )   PT: 13.1 sec;   INR: 1.15 ratio           Outpatient TTE (10/2019):  LVEF 55-60%  Right ventricular volume and pressure overload  Bovine mitral valve prosthesis present, mitral prosthesis regurgitation  Moderate aortic valve stenosis     ECG (2/11/2020): ventricular paced        Assessment and Plan:   89 year old woman with past medical history of Severe pulmonary hypertension (mixed etiology) on home oxygen (2L), chronic HFpEF,  Bioprosthetic mitral valve, COPD, Atrial fibrillation (on coumadin), permanent pacemaker, moderate AS who was sent to the hospital due to low oxygen ~ 70s at home, likely due to severe pulmonary hypertension. Patient denies exertional angina and troponin is negative.   -Patient now meets sepsis criteria with fever and low blood pressure. Primary team performing infectious workup with CT chest showing possible upper lobe infiltrates. On broad spectrum antibioitics  -Appears volume depleted and has had lasix held so far  -INR supratherapeutic and now down after vitamin K  -mental status stable, was initially with delirium  -Continues to clinical improve    Plan:  1. Coumadin for AF, goal INR 2-3. Will be slow increasing level due to patient getting vitamin K   2. Continue bosentan for PHTN  3. ANtibiotics for PNA, complete course per ID  4. DC planning  5. Outpatient follow up with Dr. Huntley. Consider restart po lasix then  6. Will not actively follow, call if questions. thank you      Tomas Spicer MD

## 2020-02-18 NOTE — PROGRESS NOTE ADULT - SUBJECTIVE AND OBJECTIVE BOX
CC: Patient is an 89y/o female with chief complaint of pneumonia    INTERVAL HPI/OVERNIGHT EVENTS:  Patient seen and examined at bedside with family present. Patient denies any new issues, no overnight events noted. Patient denies chest pain, SOB, n/v/d/c, fevers, chills, headaches, dizziness, abdominal pain.     REVIEW OF SYSTEMS:  All remaining ROS negative unless otherwise appreciated above.     Vital Signs Last 24 Hrs  T(C): 36.2 (18 Feb 2020 08:45), Max: 37.1 (17 Feb 2020 18:18)  T(F): 97.1 (18 Feb 2020 08:45), Max: 98.7 (17 Feb 2020 18:18)  HR: 72 (18 Feb 2020 15:47) (70 - 78)  BP: 103/52 (18 Feb 2020 08:45) (100/53 - 103/52)  BP(mean): --  RR: 19 (18 Feb 2020 08:45) (18 - 19)  SpO2: 96% (18 Feb 2020 15:47) (93% - 97%)    PHYSICAL EXAM:  GENERAL: Pt lying in bed comfortably in NAD, elderly   HEAD:  Atraumatic, Normocephalic  EYES: EOMI, PERRLA, conjunctiva and sclera clear  ENT: Moist mucous membranes  NECK: Supple, No JVD  CHEST/LUNG: Decreased breath sounds b/l, no rhales, rhonchi, wheezes, Unlabored respirations  HEART: Regular rate and rhythm; Murmur appreciated  ABDOMEN: Bowel sounds present; Soft, Nontender, Nondistended. No guarding or rigidity   EXTREMITIES:  2+ Peripheral Pulses, brisk capillary refill. No clubbing, cyanosis. +Edema L ankle, splint intactP  NERVOUS SYSTEM:  Alert & Oriented X3, speech clear   SKIN: Warm, dry    LABS:                        7.2    4.25  )-----------( 137      ( 18 Feb 2020 08:58 )             23.7     02-18    134<L>  |  90<L>  |  49.0<H>  ----------------------------<  84  4.0   |  31.0<H>  |  1.62<H>    Ca    8.7      18 Feb 2020 08:58    TPro  6.3<L>  /  Alb  3.0<L>  /  TBili  0.4  /  DBili  x   /  AST  15  /  ALT  7   /  AlkPhos  42  02-17    PT/INR - ( 18 Feb 2020 08:58 )   PT: 13.1 sec;   INR: 1.15 ratio CC: Patient is an 88y/o female with chief complaint of pneumonia    INTERVAL HPI/OVERNIGHT EVENTS:  Patient seen and examined at bedside with family present. Patient denies any new issues, no overnight events noted. Patient denies chest pain, SOB, n/v/d/c, fevers, chills, headaches, dizziness, abdominal pain.     All remaining ROS negative unless otherwise appreciated above.     Vital Signs Last 24 Hrs  T(C): 36.2 (18 Feb 2020 08:45), Max: 37.1 (17 Feb 2020 18:18)  T(F): 97.1 (18 Feb 2020 08:45), Max: 98.7 (17 Feb 2020 18:18)  HR: 72 (18 Feb 2020 15:47) (70 - 78)  BP: 103/52 (18 Feb 2020 08:45) (100/53 - 103/52)  BP(mean): --  RR: 19 (18 Feb 2020 08:45) (18 - 19)  SpO2: 96% (18 Feb 2020 15:47) (93% - 97%)    PHYSICAL EXAM:  GENERAL: Pt lying in bed comfortably in NAD, elderly   HEAD:  Atraumatic, Normocephalic  EYES: EOMI, PERRLA, conjunctiva and sclera clear  ENT: Moist mucous membranes  NECK: Supple, No JVD  CHEST/LUNG: Decreased breath sounds b/l, no rhales, rhonchi, wheezes, Unlabored respirations  HEART: Regular rate and rhythm; Murmur appreciated  ABDOMEN: Bowel sounds present; Soft, Nontender, Nondistended. No guarding or rigidity   EXTREMITIES:  2+ Peripheral Pulses, brisk capillary refill. No clubbing, cyanosis. +Edema L ankle, splint intactP  NERVOUS SYSTEM:  Alert & Oriented X3, speech clear   SKIN: Warm, dry    LABS:                        7.2    4.25  )-----------( 137      ( 18 Feb 2020 08:58 )             23.7     02-18    134<L>  |  90<L>  |  49.0<H>  ----------------------------<  84  4.0   |  31.0<H>  |  1.62<H>    Ca    8.7      18 Feb 2020 08:58    TPro  6.3<L>  /  Alb  3.0<L>  /  TBili  0.4  /  DBili  x   /  AST  15  /  ALT  7   /  AlkPhos  42  02-17    PT/INR - ( 18 Feb 2020 08:58 )   PT: 13.1 sec;   INR: 1.15 ratio

## 2020-02-18 NOTE — PROGRESS NOTE ADULT - ASSESSMENT
ASSESSMENT/ PLAN:  89 y/o female with past medical history of severe pulmonary hypertension (mixed etiology) on home oxygen (2L NC), HFpEF,  Bioprosthetic mitral valve, COPD ? Atrial fibrillation (on coumadin), permanent pacemaker, was brought in as pt's daughter noticed that for 1 week pt's oxygenation was running low and she was in need of increase oxygen compared to her baseline. Pt also had noticed some left foot area pain, swelling and warmth x few days. Pt has been ambulatory and not sure how pain and swelling in left foot started, as per daughter pt fell at home and had foot fracture. Pt admitted to medicine for acute hypoxic respiratory failure and management of L fifth metatarsal head avulsion fracture.     1. Bilateral Pneumonia  -c/w zosyn and azithromycin   -ID consult appreciated, will complete 7 days of zosyn    2. Acute on Chronic Hypoxic Respiratory Failure- Improved  -Continue zosyn and azithromycin  -Pulmonology consult appreciated    3. Pulmonary Hypertension  -Continue Bosanten  -Pulmonology consult appreciated  -Poor prognosis, patient is functionally debilitated, palliative care eval appreciated    4. Chronic atrial fibrillation, rate controlled, with PPM  -Coumadin held d/t elevated INR, s/p vitamin k, goal of 2-3 as per cardio  -Continue digoxin  -Continue metoprolol  -Cardiology consult appreciated, patient to follow with Dr. Huntley outpatient    5. Left 5th metatarsal avulsion fracture  -Left foot splint  -Leg elevation  -NWB  -PT following  -Podiatry consult appreciated   -Pain control     6. Right foot pain, fracture unlikely   -Pain control  -Podiatry consult appreciated  -WB as tolerated to R foot in surgical shoe    7. Anemia of chronic disease  -Stable  -Trend H/H  -Transfuse PRN    Dispo:  PT eval- plan for ANDIE once medically stable, likely 24-48 hrs   Patient is to follow with Dr. Huntley as outpatient, will resume lasix at this time  Patient is to follow with Dr. Sullivan/Jamilah podiatry as outpatient ASSESSMENT/ PLAN:  89 y/o female with past medical history of severe pulmonary hypertension (mixed etiology) on home oxygen (2L NC), HFpEF,  Bioprosthetic mitral valve, COPD ? Atrial fibrillation (on coumadin), permanent pacemaker, was brought in as pt's daughter noticed that for 1 week pt's oxygenation was running low and she was in need of increase oxygen compared to her baseline. Pt also had noticed some left foot area pain, swelling and warmth x few days. Pt has been ambulatory and not sure how pain and swelling in left foot started, as per daughter pt fell at home and had foot fracture. Pt admitted to medicine for acute hypoxic respiratory failure and management of L fifth metatarsal head avulsion fracture.     1. Bilateral Pneumonia  -c/w zosyn and azithromycin   -ID consult appreciated, will complete 7 days of zosyn, last dose 2/19     2. Acute on Chronic Hypoxic Respiratory Failure- Improved  -Continue zosyn and azithromycin  -Pulmonology consult appreciated    3. Pulmonary Hypertension  -Continue Bosanten  -Pulmonology consult appreciated  -Poor prognosis, patient is functionally debilitated, palliative care eval appreciated    4. Chronic atrial fibrillation, rate controlled, with PPM  -Coumadin held d/t elevated INR, s/p vitamin k, goal of 2-3 as per cardio  -Continue digoxin  -Continue metoprolol  -Cardiology consult appreciated, patient to follow with Dr. Huntley outpatient    5. Left 5th metatarsal avulsion fracture  -Left foot splint  -Leg elevation  -NWB  -PT following  -Podiatry consult appreciated   -Pain control     6. Right foot pain, fracture unlikely   -Pain control  -Podiatry consult appreciated  -WB as tolerated to R foot in surgical shoe    7. Anemia of chronic disease  -Stable  -Trend H/H  -Transfuse PRN    Dispo:  PT eval- plan for ANDIE once medically stable, likely tomorrow   Patient is to follow with Dr. Huntley as outpatient, will resume lasix at this time  Patient is to follow with Dr. Sullivan/Jamilah podiatry as outpatient

## 2020-02-18 NOTE — PROGRESS NOTE ADULT - PROBLEM SELECTOR PLAN 5
Hx of severe PH functionally debilitated, generally homebound. Home hospice services could be considered. However, patient on medication,  Tracleer for PH.  Discussed with hospice- medication would NOT be covered.   Medication is from a specialty pharmacy ( as per daughter)  and would need to pay out of pocket if to have hospice services.

## 2020-02-18 NOTE — PROGRESS NOTE ADULT - ASSESSMENT
88 y/o female who is on home o2 , 2 L nc for his h/o severe pulmonary htn was brought in as pt's daughter noticed that for past 1 week pt's oxygenation runs low 70's ? and she was in need of increase oxygen than her baseline. pt. has some cough, no fever. no sick contact. pt. walks with walker but denies exertional dyspnea, no cp. no syncopy.   no abd. pain. no n /v/d. pt. has noticed some left foot area pain and swelling, some warmth for past few days.PT WITH FALL AT HOME AS PER DAUGHTER AND HAD FOOT FRACTURE .    pt. has past medical history of Severe pulmonary hypertension (mixed etiology) on home oxygen (2L), HFpEF,  Bioprosthetic mitral valve, COPD ? Atrial fibrillation (on coumadin), permanent pacemaker.   AFEBRILE AWAKE AND ALERT      ? PNEUMONIA    HAS BEEN ON ZOSYN DAY6   WOULD COMPLETE 7 DAYS  DIFFICULT TO KNOW IF SUPERIMPOSED PNEUMONIA  OVERALL IMPROVED WILL FOLLOWUP      AS NEEDED PLEASE CALL IF QUESTIONS

## 2020-02-18 NOTE — PROGRESS NOTE ADULT - ASSESSMENT
Assess    Severe pulmonary HTN - likely related to valvular HD  Advanced age  Possible pneumonia  Poor outlook - overall  DNR-I    Rec    02  Differ AC given anemia  Comfort measures  Complete ABx  ANDIE soon   Little to add

## 2020-02-18 NOTE — PROGRESS NOTE ADULT - SUBJECTIVE AND OBJECTIVE BOX
INFECTIOUS DISEASES AND INTERNAL MEDICINE at [facility]  =======================================================  Jermaine Craig MD  Diplomates American Board of Internal Medicine and Infectious Diseases  Telephone [phone]  Fax            [phone]  =======================================================    HARMAN MONTE [id number]    Follow up: ?PNEUMONIA    Allergies:  penicillin (Hives)      Medications:  acetaminophen   Tablet .. 650 milliGRAM(s) Oral every 6 hours PRN  albuterol/ipratropium for Nebulization 3 milliLiter(s) Nebulizer every 6 hours  atorvastatin 10 milliGRAM(s) Oral at bedtime  azithromycin  IVPB      azithromycin  IVPB 500 milliGRAM(s) IV Intermittent every 24 hours  bosentan 125 milliGRAM(s) Oral two times a day  digoxin     Tablet 0.125 milliGRAM(s) Oral daily  lactobacillus acidophilus 1 Tablet(s) Oral two times a day with meals  metoprolol tartrate 25 milliGRAM(s) Oral two times a day  nystatin Powder 1 Application(s) Topical two times a day  piperacillin/tazobactam IVPB.. 3.375 Gram(s) IV Intermittent every 8 hours  pregabalin 100 milliGRAM(s) Oral two times a day  sertraline 50 milliGRAM(s) Oral daily    SOCIAL       FAMILY   FAMILY HISTORY:  FH: heart disease: mother    REVIEW OF SYSTEMS:  CONSTITUTIONAL:  No Fever or chills  HEENT:   No diplopia or blurred vision.  No earache, sore throat or runny nose.  CARDIOVASCULAR:  No pressure, squeezing, strangling, tightness, heaviness or aching about the chest, neck, axilla or epigastrium.  RESPIRATORY:   POS  cough, shortness of breath,    GASTROINTESTINAL:  No nausea, vomiting or diarrhea.  GENITOURINARY:  No dysuria, frequency or urgency. No Blood in urine  MUSCULOSKELETAL:   AS PER HPI  SKIN:  No change in skin, hair or nails.  NEUROLOGIC: AS PER HPI           Physical Exam:   Vital Signs Last 24 Hrs  T(C): 36.2 (18 Feb 2020 08:45), Max: 37.1 (17 Feb 2020 18:18)  T(F): 97.1 (18 Feb 2020 08:45), Max: 98.7 (17 Feb 2020 18:18)  HR: 72 (18 Feb 2020 08:45) (70 - 78)  BP: 103/52 (18 Feb 2020 08:45) (100/53 - 103/52)  BP(mean): --  RR: 19 (18 Feb 2020 08:45) (18 - 19)  SpO2: 93% (18 Feb 2020 08:45) (93% - 97%)    GEN: NAD,   HEENT: normocephalic and atraumatic. EOMI. KATRIN.    NECK: Supple. No carotid bruits.  No lymphadenopathy or thyromegaly.  LUNGS: Clear to auscultation.  HEART: Regular rate and rhythm without murmur.  ABDOMEN: Soft, nontender, and nondistended.  Positive bowel sounds.    : No CVA tenderness  EXTREMITIES: Without any cyanosis, clubbing, rash, lesions or edema.  MSK: no joint swelling  NEUROLOGIC: Cranial nerves II through XII are grossly intact.  PSYCHIATRIC: Appropriate affect .  SKIN: No ulceration or induration present.        Labs:                            7.2    4.25  )-----------( 137      ( 18 Feb 2020 08:58 )             23.7   02-18    134<L>  |  90<L>  |  49.0<H>  ----------------------------<  84  4.0   |  31.0<H>  |  1.62<H>    Ca    8.7      18 Feb 2020 08:58    TPro  6.3<L>  /  Alb  3.0<L>  /  TBili  0.4  /  DBili  x   /  AST  15  /  ALT  7   /  AlkPhos  42  02-17            RECENT CULTURES:  02-13 @ 17:46          NotDete  02-13 @ 17:42 .Blood     No growth at 48 hours        02-11 @ 23:12 .Blood     No growth at 5 days.        02-11 @ 14:49 .Blood     No growth at 5 days.        02-11 @ 14:48 .Blood     No growth at 5 days.

## 2020-02-18 NOTE — PROGRESS NOTE ADULT - SUBJECTIVE AND OBJECTIVE BOX
PULMONARY PROGRESS NOTE      FRANCISCO MONTE-[id number]    Patient is a 89y old  Female who presents with a chief complaint of low oxygenation (17 Feb 2020 10:15)  Severe pulmonary HTN on Trachleer  HFpEF  MVR - bioprosthetic; Moderate AS  Afib  Mild COPD  DNR-I      INTERVAL HPI/OVERNIGHT EVENTS:  Marginal - OK   Cardio signed off  Podiatry Rx complete  Finishing Zosyn course    MEDICATIONS  (STANDING):  albuterol/ipratropium for Nebulization 3 milliLiter(s) Nebulizer every 6 hours  atorvastatin 10 milliGRAM(s) Oral at bedtime  azithromycin  IVPB      azithromycin  IVPB 500 milliGRAM(s) IV Intermittent every 24 hours  bosentan 125 milliGRAM(s) Oral two times a day  digoxin     Tablet 0.125 milliGRAM(s) Oral daily  lactobacillus acidophilus 1 Tablet(s) Oral two times a day with meals  metoprolol tartrate 25 milliGRAM(s) Oral two times a day  nystatin Powder 1 Application(s) Topical two times a day  piperacillin/tazobactam IVPB.. 3.375 Gram(s) IV Intermittent every 8 hours  pregabalin 100 milliGRAM(s) Oral two times a day  sertraline 50 milliGRAM(s) Oral daily      MEDICATIONS  (PRN):  acetaminophen   Tablet .. 650 milliGRAM(s) Oral every 6 hours PRN Temp greater or equal to 38C (100.4F), Mild Pain (1 - 3)      Allergies    penicillin (Hives)    Intolerances        PAST MEDICAL & SURGICAL HISTORY:  History of COPD  H/O pulmonary hypertension  Atrial fibrillation  H/O prosthetic mitral valve: bioprosthetic.  Pacemaker      SOCIAL HISTORY  Smoking History:       REVIEW OF SYSTEMS:    CONSTITUTIONAL:  No distress    HEENT:  Eyes:  No diplopia or blurred vision. ENT:  No earache, sore throat or runny nose.    CARDIOVASCULAR:  No pressure, squeezing, tightness, heaviness or aching about the chest; no palpitations.    RESPIRATORY:  No  orthopnea. Mod SOBOE    GASTROINTESTINAL:  No nausea, vomiting or diarrhea.    GENITOURINARY:  No dysuria, frequency or urgency.    NEUROLOGIC:  No paresthesias, fasciculations, seizures or weakness.    PSYCHIATRIC:  No disorder of thought or mood.    Vital Signs Last 24 Hrs  T(C): 36.7 (17 Feb 2020 10:00), Max: 36.7 (17 Feb 2020 10:00)  T(F): 98.1 (17 Feb 2020 10:00), Max: 98.1 (17 Feb 2020 10:00)  HR: 70 (17 Feb 2020 10:00) (70 - 91)  BP: 106/61 (17 Feb 2020 10:00) (92/54 - 106/66)  BP(mean): --  RR: 16 (17 Feb 2020 10:00) (16 - 18)  SpO2: 98% (17 Feb 2020 10:00) (97% - 99%)    PHYSICAL EXAMINATION:    GENERAL: The patient is awake and alert in no apparent distress.     HEENT: Head is normocephalic and atraumatic. Extraocular muscles are intact. Mucous membranes are moist.    NECK: Supple.    LUNGS: Clear to auscultation without wheezing, rales or rhonchi; respirations unlabored    HEART: Regular rate and rhythm without murmur.    ABDOMEN: Soft, nontender, and nondistended.      EXTREMITIES: Without any cyanosis, clubbing, rash, lesions or edema.    NEUROLOGIC: Grossly intact.    LABS:                        7.0    5.29  )-----------( 126      ( 16 Feb 2020 08:07 )             22.7     02-16    134<L>  |  90<L>  |  64.0<H>  ----------------------------<  119<H>  3.9   |  32.0<H>  |  1.83<H>    Ca    8.7      16 Feb 2020 08:07    TPro  6.1<L>  /  Alb  2.9<L>  /  TBili  0.4  /  DBili  x   /  AST  12  /  ALT  6   /  AlkPhos  38<L>  02-16    PT/INR - ( 16 Feb 2020 08:07 )   PT: 15.4 sec;   INR: 1.35 ratio             RADIOLOGY & ADDITIONAL STUDIES:    CT Chest on 2/14/20  IMPRESSION:     Mild patchy nodular opacities bilaterally as above which are concerning for infection. Underlying true nodules cannot be excluded and follow-up following treatment is recommended. Mild mosaic perfusion. Cardiomegaly. Minimal left pleural fluid. Mild bilateral interlobular septal thickening which may be related to cardiac dysfunction. Please correlate clinically.    Gallstones. Probable partially imaged pharyngean cap in the gallbladder fundus although this is very limited in evaluation. Recommend nonemergent right upper quadrant ultrasound to evaluate the gallbladder fundus to exclude pathology.      MARY YBARRA M.D., ATTENDING RADIOLOGIST        -

## 2020-02-19 NOTE — PROGRESS NOTE ADULT - REASON FOR ADMISSION
low oxygenation
Detail Level: Simple

## 2020-02-19 NOTE — CHART NOTE - NSCHARTNOTEFT_GEN_A_CORE
Source: Patient [ x]  Family [ x]   other [ ]    Current Diet: Diet, DASH/TLC:   Sodium & Cholesterol Restricted  Mechanical Soft (02-17-20 @ 13:18)    PO intake: Pt reported very poor po intake    Current Weight:   (2/13) 148.3#  (2/12) 147.7#  -Obtain current wt, continue to monitor. Generalized 1+ edema noted.    Pertinent Medications: MEDICATIONS  (STANDING):  albuterol/ipratropium for Nebulization 3 milliLiter(s) Nebulizer every 6 hours  atorvastatin 10 milliGRAM(s) Oral at bedtime  azithromycin  IVPB      azithromycin  IVPB 500 milliGRAM(s) IV Intermittent every 24 hours  bosentan 125 milliGRAM(s) Oral two times a day  digoxin     Tablet 0.125 milliGRAM(s) Oral daily  lactobacillus acidophilus 1 Tablet(s) Oral two times a day with meals  metoprolol tartrate 25 milliGRAM(s) Oral two times a day  nystatin Powder 1 Application(s) Topical two times a day  piperacillin/tazobactam IVPB.. 3.375 Gram(s) IV Intermittent every 8 hours  sertraline 50 milliGRAM(s) Oral daily    MEDICATIONS  (PRN):  acetaminophen   Tablet .. 650 milliGRAM(s) Oral every 6 hours PRN Temp greater or equal to 38C (100.4F), Mild Pain (1 - 3)    Pertinent Labs: CBC Full  -  ( 18 Feb 2020 08:58 )  WBC Count : 4.25 K/uL  RBC Count : 2.83 M/uL  Hemoglobin : 7.2 g/dL  Hematocrit : 23.7 %  Platelet Count - Automated : 137 K/uL  Mean Cell Volume : 83.7 fl  Mean Cell Hemoglobin : 25.4 pg  Mean Cell Hemoglobin Concentration : 30.4 gm/dL  Auto Neutrophil # : x  Auto Lymphocyte # : x  Auto Monocyte # : x  Auto Eosinophil # : x  Auto Basophil # : x  Auto Neutrophil % : x  Auto Lymphocyte % : x  Auto Monocyte % : x  Auto Eosinophil % : x  Auto Basophil % : x    -No recent labs    Skin: IAD    Nutrition focused physical exam previously conducted - found signs of malnutrition [ ]absent [x ]present    Subcutaneous fat loss: [ x] Orbital fat pads region, [x ]Buccal fat region, [ ]Triceps region,  [ ]Ribs region    Muscle wasting: [x ]Temples region, [x ]Clavicle region, [x ]Shoulder region, [ ]Scapula region, [ ]Interosseous region,  [ ]thigh region, [ ]Calf region    Estimated Needs:   [ x] no change since previous assessment  [ ] recalculated:     Current Nutrition Diagnosis: Pt remains at high nutrition risk and meets criteria for moderate acute malnutrition related to inability to meet increased protein-energy needs secondary to pneumonia, pulmonary hypertension, sepsis, advanced age as evidenced by pt meeting <75% estimated energy needs >7 days, mild muscle/fat loss, 2+ edema. Pt continues to have poor po intake, consuming <10% of meals. Pt daughter present during assessment reported pt appetite is slightly improving. Pt reported food often feels "stuck" in her throat, consider SLP assessment. Pt encouraged to consume HBV protein. Last documented BM 2/17. RD to remain available.     Recommendations:   1) Consider SLP assessment if medically feasible  2) Encourage po intake and HBV protein  3) RX: Add Ensure Pudding TID to optimize po intake and provide an additional 170kcal, 4g protein per serving   4) Monitor wts and labs    Monitoring and Evaluation:   [ x] PO intake [ x] Tolerance to diet prescription [X] Weights  [X] Follow up per protocol [X] Labs:

## 2020-02-19 NOTE — PROGRESS NOTE ADULT - ATTENDING COMMENTS
Patient was seen bedside with resident.  I reviewed the above assessment and documentation completed by the resident physician.  I verbally discussed the evaluation and treatment plan with resident.  The podiatry team will continue to follow patient while in house.
patient seen and eval. agree with above.   patient will complete last dose on abxs tonight.   plan for dc to [facility] in am   coumadin resumed   plan of care discussed with daughter and son at bedside,
patient seen and eval. agree with above.   last dose abxs tomorrow   plan for rehab tomorrow   plan of care discussed with patient's daughter ainsley on phone. [phone]

## 2020-02-19 NOTE — PROGRESS NOTE ADULT - SUBJECTIVE AND OBJECTIVE BOX
CC:  followup GOC  INTERVAL HPI/OVERNIGHT EVENTS:  none    PRESENT SYMPTOMS: SOURCE:  Patient/Family/Team    PAIN SCALE:  0 = none  1 = mild   2 = moderate  3 = severe    Pain: appears comfortable    Dyspnea:  [ ] YES [x ] NO  Anxiety:  [ ] YES [x ] NO  Fatigue: [ x] YES [ ] NO  Nausea: [ ] YES [ x] NO  Loss of Appetite: [ ] YES [ ] NO  na  Other symptoms: __________    MEDICATIONS  (STANDING):  albuterol/ipratropium for Nebulization 3 milliLiter(s) Nebulizer every 6 hours  atorvastatin 10 milliGRAM(s) Oral at bedtime  azithromycin  IVPB      azithromycin  IVPB 500 milliGRAM(s) IV Intermittent every 24 hours  bosentan 125 milliGRAM(s) Oral two times a day  digoxin     Tablet 0.125 milliGRAM(s) Oral daily  lactobacillus acidophilus 1 Tablet(s) Oral two times a day with meals  metoprolol tartrate 25 milliGRAM(s) Oral two times a day  nystatin Powder 1 Application(s) Topical two times a day  piperacillin/tazobactam IVPB.. 3.375 Gram(s) IV Intermittent every 8 hours  sertraline 50 milliGRAM(s) Oral daily    MEDICATIONS  (PRN):  acetaminophen   Tablet .. 650 milliGRAM(s) Oral every 6 hours PRN Temp greater or equal to 38C (100.4F), Mild Pain (1 - 3)      Allergies    penicillin (Hives)    Intolerances    Ensure Pudding TID RDOK (Unknown)      Karnofsky Performance Score/Palliative Performance Status Version 2:   40   %    Vital Signs Last 24 Hrs  T(C): 36.8 (19 Feb 2020 08:29), Max: 36.8 (18 Feb 2020 17:02)  T(F): 98.3 (19 Feb 2020 08:29), Max: 98.3 (19 Feb 2020 08:29)  HR: 74 (19 Feb 2020 09:51) (70 - 80)  BP: 103/57 (19 Feb 2020 08:29) (99/50 - 118/68)  BP(mean): --  RR: 19 (19 Feb 2020 08:29) (18 - 19)  SpO2: 95% (19 Feb 2020 09:51) (93% - 99%)    PHYSICAL EXAM:    General: Elderly woman NAD sleepy, arousable    HEENT: [x ] normal  [ ] dry mouth  [ ] ET tube/trach    Lungs: [ x] comfortable - nasal canula  CV: [x ] normal  [ ] tachycardia    GI: [ x] normal  [ ] distended  [ ] tender  [ ] no BS               [ ] PEG/NG/OG tube    : [x ] normal  [ ] incontinent  [ ] oliguria/anuria  [ ] fr[place]    MSK: [ ] normal  [ x] weakness  [ ] edema             [ ] ambulatory  [x ] bedbound/wheelchair bound    Skin: [ ] normal  [ ] pressure ulcers- Stage_____  [x ] no rash  Left foot splint    LABS:                        7.2    4.25  )-----------( 137      ( 18 Feb 2020 08:58 )             23.7     02-18    134<L>  |  90<L>  |  49.0<H>  ----------------------------<  84  4.0   |  31.0<H>  |  1.62<H>    Ca    8.7      18 Feb 2020 08:58      PT/INR - ( 18 Feb 2020 08:58 )   PT: 13.1 sec;   INR: 1.15 ratio         ADVANCE DIRECTIVES:  [x ] YES [ ] NO    DNR: [ x] YES [ ] NO      Date Completed:                    RIP [x] YES [ ] NO   Date Completed:     Thank you for the opportunity to assist with the care of this patient.   [place] [facility] [phone].

## 2020-02-19 NOTE — PROGRESS NOTE ADULT - PROBLEM SELECTOR PLAN 4
Hx of severe PH functionally debilitated, generally homebound.    She would be good candidate for home hospice. However, informed by hospice they would not cover the cost of PH med- Tracleer.   Plan for [facility] completed - should be sent with patient when she goes to [facility].   Palliative Care to sign off. Hx of severe PH functionally debilitated, generally homebound. She would be good candidate for home hospice. However, informed by hospice they would not cover the cost of PH med- Tracleer.   Plan for [facility].  RIP completed DNR/I - should be sent with patient when she goes to [facility].   Palliative Care to sign off.

## 2020-02-19 NOTE — PROGRESS NOTE ADULT - ASSESSMENT
ASSESSMENT/ PLAN:  89 y/o female with past medical history of severe pulmonary hypertension (mixed etiology) on home oxygen (2L NC), HFpEF,  Bioprosthetic mitral valve, COPD ? Atrial fibrillation (on coumadin), permanent pacemaker, was brought in as pt's daughter noticed that for 1 week pt's oxygenation was running low and she was in need of increase oxygen compared to her baseline. Pt also had noticed some left foot area pain, swelling and warmth x few days. Pt has been ambulatory and not sure how pain and swelling in left foot started, as per daughter pt fell at home and had foot fracture. Pt admitted to medicine for acute hypoxic respiratory failure and management of L fifth metatarsal head avulsion fracture.     1. Bilateral Pneumonia  -course Zosyn and Azithro completed today 2/19     2. Acute on Chronic Hypoxic Respiratory Failure- Improved  -Pulmonology consult appreciated  -completed antibiotics    3. Pulmonary Hypertension  -Continue Bosanten  -Pulmonology consult appreciated  -Poor prognosis, patient is functionally debilitated, palliative care eval appreciated    4. Chronic atrial fibrillation, rate controlled, with PPM  -Coumadin was held d/t elevated INR, s/p vitamin k, now subtherapeutic  -Coumadin tonight, f/u INR in am    -Continue digoxin  -Continue metoprolol  -Cardiology consult appreciated, patient to follow with Dr. Huntley outpatient    5. Left 5th metatarsal avulsion fracture  -Left foot splint  -Leg elevation  -NWB on left  -PT following  -Podiatry consult appreciated   -Pain control     6. Right foot pain, fracture unlikely   -Pain control  -Podiatry consult appreciated  -WB as tolerated to R foot in surgical shoe    7. Anemia of chronic disease  -Stable  -Trend H/H  -Transfuse PRN    Dispo:  PT eval- plan for [facility] tomorrow  Daily Coumadin dosing and INR at [facility]   Patient is to follow with Dr. Huntley as outpatient, will resume lasix at this time  Patient is to follow with Dr. Sullivan/Jamilah podiatry as outpatient

## 2020-02-19 NOTE — PROGRESS NOTE ADULT - SUBJECTIVE AND OBJECTIVE BOX
HARMAN MONTE Female 89y [id number]    Patient is a 89y old  Female who presents with a chief complaint of low oxygenation (19 Feb 2020 14:20)      Subjective/objective:  Pt seen and examined at bedside, no over night event reported by night staff.     PHYSICAL EXAM:    Vital Signs Last 24 Hrs  T(C): 36.8 (19 Feb 2020 08:29), Max: 36.8 (18 Feb 2020 17:02)  T(F): 98.3 (19 Feb 2020 08:29), Max: 98.3 (19 Feb 2020 08:29)  HR: 74 (19 Feb 2020 09:51) (70 - 80)  BP: 103/57 (19 Feb 2020 08:29) (99/50 - 118/68)  BP(mean): --  RR: 19 (19 Feb 2020 08:29) (18 - 19)  SpO2: 95% (19 Feb 2020 09:51) (93% - 99%)    GENERAL: Elderly female, pt lying comfortably, NAD.  HEENT: NC/ATI.  NECK: soft, Supple, No JVD,   CHEST/LUNG: Clear to auscultation bilaterally; No wheezing.  HEART: S1S2+, Regular rate and rhythm; No murmurs.  ABDOMEN: Soft, Nontender, Nondistended; Bowel sounds present.  MUSCULOSKELETAL: Normal range of motion.  SKIN: No rashes or lesions.  NEURO: AAOX3, alert and pleasant.  PSYCH: normal mood.    MEDICATIONS  (STANDING):  albuterol/ipratropium for Nebulization 3 milliLiter(s) Nebulizer every 6 hours  atorvastatin 10 milliGRAM(s) Oral at bedtime  azithromycin  IVPB      azithromycin  IVPB 500 milliGRAM(s) IV Intermittent every 24 hours  bosentan 125 milliGRAM(s) Oral two times a day  digoxin     Tablet 0.125 milliGRAM(s) Oral daily  lactobacillus acidophilus 1 Tablet(s) Oral two times a day with meals  metoprolol tartrate 25 milliGRAM(s) Oral two times a day  nystatin Powder 1 Application(s) Topical two times a day  piperacillin/tazobactam IVPB.. 3.375 Gram(s) IV Intermittent every 8 hours  sertraline 50 milliGRAM(s) Oral daily    MEDICATIONS  (PRN):  acetaminophen   Tablet .. 650 milliGRAM(s) Oral every 6 hours PRN Temp greater or equal to 38C (100.4F), Mild Pain (1 - 3)    LABS:                        7.2    4.25  )-----------( 137      ( 18 Feb 2020 08:58 )             23.7     02-18    134<L>  |  90<L>  |  49.0<H>  ----------------------------<  84  4.0   |  31.0<H>  |  1.62<H>    Ca    8.7      18 Feb 2020 08:58      PT/INR - ( 18 Feb 2020 08:58 )   PT: 13.1 sec;   INR: 1.15 ratio

## 2020-02-20 NOTE — DISCHARGE NOTE PROVIDER - NSDCMRMEDTOKEN_GEN_ALL_CORE_FT
atorvastatin 10 mg oral tablet: 1 tab(s) orally once a day  digoxin 125 mcg (0.125 mg) oral tablet: 1 tab(s) orally once a day  Feosol 325 mg (65 mg elemental iron) oral tablet: 1 tab(s) orally 3 times a day  Lyrica 100 mg oral capsule: 1 cap(s) orally 2 times a day  metoprolol tartrate 25 mg oral tablet: 1 tab(s) orally 2 times a day  potassium chloride 20 mEq oral granule, extended release:   Tracleer 125 mg oral tablet: 1 tab(s) orally 2 times a day  warfarin 4 mg oral tablet: 1 tab(s) orally once a day  Zoloft 50 mg oral tablet: 1 tab(s) orally once a day atorvastatin 10 mg oral tablet: 1 tab(s) orally once a day  Feosol 325 mg (65 mg elemental iron) oral tablet: 1 tab(s) orally once a day  Lanoxin 125 mcg (0.125 mg) oral tablet: 1 tab(s) orally every other day  Lyrica 100 mg oral capsule: 1 cap(s) orally 3 times a day  magnesium oxide 400 mg (241.3 mg elemental magnesium) oral tablet: 1 tab(s) orally once a day  metoprolol tartrate 25 mg oral tablet: 1 tab(s) orally 2 times a day  potassium chloride 20 mEq oral granule, extended release:   Tracleer 125 mg oral tablet: 1 tab(s) orally 2 times a day  Vitamin D3 2000 intl units (50 mcg) oral tablet: 1 tab(s) orally once a day  warfarin 4 mg oral tablet: 1 tab(s) orally once a day  Zoloft 50 mg oral tablet: 1 tab(s) orally once a day atorvastatin 10 mg oral tablet: 1 tab(s) orally once a day  Feosol 325 mg (65 mg elemental iron) oral tablet: 1 tab(s) orally once a day  Lyrica 100 mg oral capsule: 1 cap(s) orally 3 times a day  magnesium oxide 400 mg (241.3 mg elemental magnesium) oral tablet: 1 tab(s) orally once a day  metoprolol tartrate 25 mg oral tablet: 1 tab(s) orally 2 times a day  potassium chloride 20 mEq oral granule, extended release:   Tracleer 125 mg oral tablet: 1 tab(s) orally 2 times a day  Vitamin D3 2000 intl units (50 mcg) oral tablet: 1 tab(s) orally once a day  warfarin 4 mg oral tablet: 1 tab(s) orally once a day  Zoloft 50 mg oral tablet: 1 tab(s) orally once a day atorvastatin 10 mg oral tablet: 1 tab(s) orally once a day  digoxin 125 mcg (0.125 mg) oral tablet: 1 tab(s) orally every other day  Feosol 325 mg (65 mg elemental iron) oral tablet: 1 tab(s) orally once a day  Lyrica 100 mg oral capsule: 1 cap(s) orally 3 times a day  magnesium oxide 400 mg (241.3 mg elemental magnesium) oral tablet: 1 tab(s) orally once a day  metoprolol tartrate 25 mg oral tablet: 1 tab(s) orally 2 times a day  potassium chloride 20 mEq oral granule, extended release:   Tracleer 125 mg oral tablet: 1 tab(s) orally 2 times a day  Vitamin D3 2000 intl units (50 mcg) oral tablet: 1 tab(s) orally once a day  warfarin 4 mg oral tablet: 1 tab(s) orally once a day  Zoloft 50 mg oral tablet: 1 tab(s) orally once a day

## 2020-02-20 NOTE — DISCHARGE NOTE PROVIDER - HOSPITAL COURSE
89 y/o female with past medical history of severe pulmonary hypertension (mixed etiology) on home oxygen (2L NC), HFpEF,  Bioprosthetic mitral valve, COPD ? Atrial fibrillation (on coumadin), permanent pacemaker, was brought in as pt's daughter noticed that for 1 week pt's oxygenation was running low and she was in need of increase oxygen compared to her baseline. Pt also had noticed some left foot area pain, swelling and warmth x few days. Pt has been ambulatory and not sure how pain and swelling in left foot started, as per daughter pt fell at home and had foot fracture. Pt admitted to medicine for acute hypoxic respiratory failure and management of L fifth metatarsal head avulsion fracture. On imaging patient was noted to have bilateral Pneumonia and was treated with a course of IV Zosyn and Azithromycin which was completed today 2/19. Pulmonary was consulted for acute on chronic hypoxic respiratory failure and patient has now improved with medical management. Patient will follow up w Dr Barahona as outpatient.     Palliative Care was asked to follow this patient in the setting of severe pulmonary Htn, deconditioning and functional debility with poor prognosis.    Pt had initially had elevated INR, s/p Vitamin K, Coumadin was held. Cardiology was consulted.  INR now subtherapeutic, Coumadin restarted, pt will have daily INR check and daily [facility] dosing at [facility]. Pt will continue digoxin and metoprolol. Patient to follow with Dr. Huntley outpatient.    Patient was noted to have Left 5th metatarsal avulsion fracture. Podiatry was consulted, left foot splinted, rec leg elevation, NWB on left. Pt to follow up w Dr Sullivan/Vania after discharge.         Vital Signs Last 24 Hrs    T(C): 36.9 (20 Feb 2020 08:38), Max: 36.9 (19 Feb 2020 17:10)    T(F): 98.4 (20 Feb 2020 08:38), Max: 98.5 (19 Feb 2020 17:10)    HR: 70 (20 Feb 2020 08:38) (69 - 71)    BP: 106/49 (20 Feb 2020 08:38) (93/50 - 108/63)    BP(mean): --    RR: 18 (20 Feb 2020 08:38) (18 - 19)    SpO2: 96% (20 Feb 2020 08:38) (94% - 99%)        GENERAL: Elderly female, pt lying comfortably, NAD.    HEENT: NC/ATI.    NECK: soft, Supple, No JVD,     CHEST/LUNG: Clear to auscultation bilaterally; No wheezing.    HEART: S1S2+, Regular rate and rhythm; No murmurs.    ABDOMEN: Soft, Nontender, Nondistended; Bowel sounds present.    MUSCULOSKELETAL: Left foot in splint, C/D/I.    SKIN: No rashes or lesions.    NEURO: AAOX3, alert and pleasant.    PSYCH: normal mood.             40 minutes spent on discharge summary and plan.

## 2020-02-20 NOTE — DISCHARGE NOTE NURSING/CASE MANAGEMENT/SOCIAL WORK - PATIENT PORTAL LINK FT
You can access the FollowMyHealth Patient Portal offered by [facility] by registering at the following website: [email or web address]. By joining [facility]s FollowMyHealth portal, you will also be able to view your health information using other applications (apps) compatible with our system.

## 2020-02-20 NOTE — DISCHARGE NOTE PROVIDER - CARE PROVIDER_API CALL
Chavez Barahona)  Critical Care Medicine; Internal Medicine; Pulmonary Disease; Sleep Medicine  [address], [address]  [place] [address]  Phone: [phone]  Fax: [phone]  Follow Up Time:     Brody Cloud)  Infectious Disease; Internal Medicine  [address] [facility], [address] [place] [address]  Phone: [phone]  Fax: [phone]  Follow Up Time:     Tomas Spicer)  Cardiovascular Disease  [address] [place] [address]  Phone: [phone]  Fax: [phone]  Follow Up Time:     Jhon AskewDPM)  Surgery  [address] [place] [address]  Phone: [phone]  Fax: [phone]  Follow Up Time:

## 2020-02-20 NOTE — DISCHARGE NOTE PROVIDER - NSDCFUSCHEDAPPT_GEN_ALL_CORE_FT
HARMAN MONTE ; 04/20/2020 ; NPP Cardiology [address] [facility]  HARMAN MONTE ; 05/18/2020 ; NP Cardiology [address] HARMAN MONTE ; 04/20/2020 ; NPP Cardiology [address] [facility]  HARMAN MONTE ; 05/18/2020 ; NP Cardiology [address] HARMAN MONTE ; 04/20/2020 ; NPP Cardiology [address] [facility]  HARMAN MONTE ; 05/18/2020 ; NP Cardiology [address] HARMAN MONTE ; 04/20/2020 ; NPP Cardiology [address] [facility]  HARMAN MONTE ; 05/18/2020 ; NP Cardiology [address] [place] HARMAN MONTE ; 04/20/2020 ; NPP Cardiology [address] [facility]  HARMAN MONTE ; 05/18/2020 ; NP Cardiology [address] [place]

## 2020-02-20 NOTE — DISCHARGE NOTE PROVIDER - PROVIDER TOKENS
PROVIDER:[TOKEN:[[id number]]],PROVIDER:[TOKEN:[[id number]]],PROVIDER:[TOKEN:[[id number]]],PROVIDER:[TOKEN:[[id number]]]

## 2020-02-20 NOTE — DISCHARGE NOTE PROVIDER - CARE PROVIDERS DIRECT ADDRESSES
,[email or web address],DirectAddress_Unknown

## 2020-02-20 NOTE — DISCHARGE NOTE PROVIDER - NSDCCPCAREPLAN_GEN_ALL_CORE_FT
PRINCIPAL DISCHARGE DIAGNOSIS  Diagnosis: Acute on chronic respiratory failure with hypoxia  Assessment and Plan of Treatment: Resolved. Secondary to pneumonia. Antibiotics completed, follow up  w PMD within 1-2 days. Follow up pulmonary, cardiology within 1 week.      SECONDARY DISCHARGE DIAGNOSES  Diagnosis: PNA (pneumonia)  Assessment and Plan of Treatment: Plan as above    Diagnosis: Atrial fibrillation  Assessment and Plan of Treatment: Coumadin was held for elevated INR. Coumadin was resumed and now INR is subtherapeutic. Pt will need daily INR checks and Coumadin dosing at rehab facility. Follow up cardiologist 1 week.    Diagnosis: Pulmonary HTN  Assessment and Plan of Treatment: Plan as above. Follow up pulmonary 1 week. Resume medications other than Lasix which was held during admission as pt has been euvolemic----> dry. Further management Lasix to be determined outpatinet by pt's cardiologist.    Diagnosis: Foot fracture, left  Assessment and Plan of Treatment: Keep splint clean and dry. Non weight bearing left foot. WBAT for right foot. Follow up podiatry within 1 week.

## 2020-03-04 NOTE — CDI QUERY NOTE - NSCDIOTHERTXTBX_GEN_ALL_CORE_HH
SUPPORTING DOCUMENTATION / EVIDENCE:  admitted with hypoxemia found to have acute on chronic hypoxic respiratory failure  H&P and attending notes 2/12 = poss some overload  Card consult =   IV lasix given  hx of HFpEF      Bnp = [phone]    CARDIO CONSULT 2/11:  Discussed with outpatient cardiologist and no need for repeat echo this admission. Echo from October with LVEF 55-60% and right ventricular volume and pressure overload. Patient has mild volume overload, recommend Lasix 40 mg IVP tonight, and re-evaluate tomorrow.     MEDS:  Lasix 40 mg IVP given on 2/11 & 2/13    ATTENDING 2/12:  acute on chronic hypoxic respiratory failure   likely multifactorial, possible some volume over load, iv lasix as per cardiologist at this point and follow clinically. O2 suport, duoneb.        Please clarify, in addendum to progress notes, acuity of CHF   - acute on chronic HFpEF on admission, resolved   - pt had only chronic HFpEF this admission   - other   - not clinically significant

## 2020-03-04 NOTE — CDI QUERY NOTE - NSCDIOTHERTXTBX2_GEN_ALL_CORE_FT
SUPPORTING DOCUMENTATION / EVIDENCE:  admitted with acute on chronic respiratory failure  conflicting documentation as to etiology  pneumonia not documented or treated until day 3 after code sepsis    CARDIO 2/12:  Hypoxemia: Likely secondary to history of severe pulmonary hypertension. Discussed with outpatient cardiologist and no need for repeat echo this admission. Echo from October with LVEF 55-60% and right ventricular volume and pressure overload. Patient has mild volume overload, recommend Lasix 40 mg IVP tonight, and re-evaluate tomorrow.     Attending 2/13:   s/p acute on chronic hypoxic respiratory failure  likely multifactorial, possible some volume over load, iv lasix as per cardiologist at this point and follow clinically. O2 support, duoneb.      Attending 2/17:   acute on chronic hypoxic respiratory failure/ improving / likely due to pna / improved now    as above   Pulmonary htn,   Continue Bos[place]en      Please clarify, in addendum to dc summary, the likely etiology of respiratory failure on admission, after study:     - respiratory failure on admission likely combination of acute/chronic HFpEF and  ____________   - respiratory failure on admission likely due to other _______   - etiology of respiratory failure on admission unknown                                                                                                   Thank you

## 2020-03-06 PROBLEM — I48.91 UNSPECIFIED ATRIAL FIBRILLATION: Chronic | Status: ACTIVE | Noted: 2020-01-01

## 2020-03-06 PROBLEM — Z87.09 PERSONAL HISTORY OF OTHER DISEASES OF THE RESPIRATORY SYSTEM: Chronic | Status: ACTIVE | Noted: 2020-01-01

## 2020-03-06 PROBLEM — Z86.79 PERSONAL HISTORY OF OTHER DISEASES OF THE CIRCULATORY SYSTEM: Chronic | Status: ACTIVE | Noted: 2020-01-01

## 2020-05-18 PROBLEM — K59.00 CONSTIPATION: Status: ACTIVE | Noted: 2020-01-01

## 2020-05-19 PROBLEM — I48.91 ATRIAL FIBRILLATION, UNSPECIFIED TYPE: Status: ACTIVE | Noted: 2018-01-16

## 2020-05-19 PROBLEM — I27.20 MODERATE TO SEVERE PULMONARY HYPERTENSION: Status: ACTIVE | Noted: 2018-01-16

## 2020-05-19 PROBLEM — J96.11 CHRONIC RESPIRATORY FAILURE WITH HYPOXIA: Status: ACTIVE | Noted: 2018-01-16

## 2020-05-19 PROBLEM — I50.33 ACUTE ON CHRONIC DIASTOLIC HEART FAILURE: Status: ACTIVE | Noted: 2018-02-20

## 2020-05-19 PROBLEM — J44.9 COPD, MILD: Status: ACTIVE | Noted: 2018-01-16

## 2020-05-19 PROBLEM — E78.5 HYPERLIPIDEMIA: Status: ACTIVE | Noted: 2018-05-24

## 2020-05-19 PROBLEM — I35.0 AORTIC VALVE STENOSIS: Status: ACTIVE | Noted: 2018-01-16

## 2020-05-19 PROBLEM — G47.33 OSA (OBSTRUCTIVE SLEEP APNEA): Status: ACTIVE | Noted: 2018-01-16

## 2020-05-19 NOTE — HISTORY OF PRESENT ILLNESS
[Home] : at home, [unfilled] , at the time of the visit. [Medical Office: ([facility])___] : at the medical office located in  [Patient] : the patient [Self] : self [FreeTextEntry1] : \par Patient for followup with regard to management of problems which include:\par \par 1. History of respiratory difficulty and hypoxemia\par \par 2. History of a bioprosthetic mitral valve replacement\par \par 3. History of severe pulmonary artery hypertension\par \par 4. History of COPD\par \par 5. History of a permanent pacemaker\par

## 2020-05-19 NOTE — PHYSICAL EXAM
[Normal Conjunctiva] : the conjunctiva exhibited no abnormalities [Normal Jugular Venous V Waves Present] : normal jugular venous V waves present [Respiration, Rhythm And Depth] : normal respiratory rhythm and effort [FreeTextEntry1] : Cannot be determined [Skin Turgor] : normal skin turgor [] : no rash [Oriented To Time, Place, And Person] : oriented to person, place, and time [Affect] : the affect was normal [No Anxiety] : not feeling anxious

## 2020-05-19 NOTE — ASSESSMENT
[FreeTextEntry1] : \par Laboratory data               3/28/19:         9/20/19 12/4/2019 5/1/20\par BUN                                        52                   55          64                         40\par Creatinine                             1.65               1.59        1.79                        1.35\par Potassium                               4.3                4.3          4.3\par LFTs are normal.                                                        LFTS WNL\par \par Pacemaker interrogation 10/23/19:\par Battery 1.5 years remained\par 100% ventricularly paced\par 6 runs of nonsustained ventricular tachycardia longest 8 beats at 168 beats per minute. All thresholds and impedances are normal.\par \par Echocardiogram 10/23/19\par Pulmonary systolic pressure 60 .mmHg\par Low-normal RV systolic function RV Vol and pressure overload\par Mild biatrial enlargement\par Moderate tricuspid insufficiency\par Bioprosthetic mitral valve well seated and functioning normally.\par Mild aortic valve stenosis.  22.3/ 39.2  mmHg Mean/peak gradients\par \par Comparison with an echocardiogram one year ago shows no significant interval change.\par \par Impression:\par 1. Clinically stable with aggressive diuretics, consistent with HFpEF and PAH.\par     Seems to have a bit more edema at this time\par \par 2. Still remains oxygen dependent which suggests a component other than heart failure. Though evaluation by Dr hooper has not demonstrated any other obvious pulmonary process\par \par 3. Creat. now better on, Lasix daily without Metalazone\par \par 4. PPM being followed locally\par \par 5. Moderately severe pulmonary artery hypertension unchanged from the last echocardiogram despite Trachleer\par     No recent echo data is available\par \par 6. Concerns of increased fatigue  which we an attribute to fractured night time sleep from going to the     bathroom  due to diuretic use.\par \par Plan:\par 1. Will continue diuretic regimen Lasix daily but for 2 days will give BID.\par \par 2. No changes in medication management at this time.\par \par 3. Repeat lab data monthly.\par \par 4.  PPM interrogation locally TBA ( [facility]) \par \par Clinical follow up in 4 months

## 2020-05-19 NOTE — REASON FOR VISIT
[FreeTextEntry1] : Patient presents for re re assessment with regard to management of\par \par 1. Hypoxemia - apparently desaturating periodically. Usually with exertion , but sometimes at rest and assoc with slight change in sensorium\par 2. Volume overload - largely controlled with the current diuretic regimen\par \par 3. Pulmonary hypertension - severe and of seemingly mixed but uncertain etiology ( see earlier notes)\par \par Patient had an extensive hospitalization earlier this year with respiratory failure. She subsequently convalesced and was then transferred to a nursing home/rehabilitation facility in [place] where she now resides.\par She states that she has been doing well with no significant increase in shortness of breath though periodically has had some increasing lower extremity edema.\par Blood work is being checked periodically to keep track of her liver function tests as regards her trach clear and her metabolic panel as regards her fluctuating renal function.\par \par She is requiring oxygen at 2 L nasal cannula on a 24 7 basis, Spo2 avg 93 %, states breathing is "better"\par Denies any increased orthopnea, PND, edema,.\par No other new or intercurrent medical problems.

## 2020-05-19 NOTE — REVIEW OF SYSTEMS
[Loss Of Hearing] : hearing loss [Dyspnea on exertion] : dyspnea during exertion [Shortness Of Breath] : shortness of breath [Joint Pain] : joint pain [Joint Swelling] : joint swelling [see HPI] : see HPI [Tremor] : a tremor was seen [Negative] : Endocrine [Lower Ext Edema] : lower extremity edema [Chest  Pressure] : no chest pressure

## 2020-12-28 NOTE — ED ADULT NURSE REASSESSMENT NOTE - NSFALLRSKHRMRISKTYPE_ED_ALL_ED
age(85 years old or older)/coagulation(Bleeding disorder R/T clinical cond/anti-coags) pt reports difficulty with FM tasks due to "thick fingers"/mild impairment

## 2024-02-03 NOTE — PROGRESS NOTE ADULT - SUBJECTIVE AND OBJECTIVE BOX
HEALTH ISSUES - PROBLEM Dx:    metatarsal #, code sepsis, FUO    INTERVAL HPI/ OVERNIGHT EVENTS:    had code sepsis yest.  all c/s sent  iv fluid bolus given  fever spike again today  ID consulted  ct non con ordered  will give maintenance meds  lasix stopped  poor Po intake sec to lethargy  d/w dgtr all of the above      REVIEW OF SYSTEMS:    as above    Vital Signs Last 24 Hrs  T(C): 40.1 (2020 11:59), Max: 40.1 (2020 11:59)  T(F): 104.1 (2020 11:59), Max: 104.1 (2020 11:59)  HR: 70 (2020 11:59) (69 - 81)  BP: 114/42 (2020 11:59) (84/58 - 129/55)  BP(mean): 66 (2020 17:27) (66 - 66)  RR: 20 (2020 11:59) (16 - 22)  SpO2: 93% (2020 09:31) (86% - 96%)    PHYSICAL EXAM-  General: An elderly  female sitting up in bed  not in distress.  HEENT: AT, NC. PERRL. intact EOM. no throat erythema or exudate.   Chest: few scattered basal crackles bilaterally, no wheeze appreciated.  Heart: S1,S2. RRR. + systolic heart murmur. ankle edema left more than right noted.  Abdomen: soft. non-tender. non-distended. + BS.   Ext: no calf tenderness. ROM at left ankle mildly diminished due to pain. edema , warmth and mild erythema noted over dorsal aspect and around ankle, no open wound, rt. ankle / foot without sig. swelling, no pain.   Neuro: AAO x3. no focal weakness. no speech disorder. CN intact.   Skin: no diaphoresis, no cyanosis, other left foot skin findings as in ext. exam.   psych : pleasant, no agitation. mood ok    MEDICATIONS  (STANDING):  acetaminophen  IVPB .. 1000 milliGRAM(s) IV Intermittent once  albuterol/ipratropium for Nebulization 3 milliLiter(s) Nebulizer every 6 hours  atorvastatin 10 milliGRAM(s) Oral at bedtime  bosentan 125 milliGRAM(s) Oral two times a day  digoxin     Tablet 0.125 milliGRAM(s) Oral daily  lactobacillus acidophilus 1 Tablet(s) Oral two times a day with meals  metoprolol tartrate 25 milliGRAM(s) Oral two times a day  nystatin Powder 1 Application(s) Topical two times a day  piperacillin/tazobactam IVPB. 3.375 Gram(s) IV Intermittent once  piperacillin/tazobactam IVPB.. 3.375 Gram(s) IV Intermittent every 8 hours  pregabalin 100 milliGRAM(s) Oral two times a day  sertraline 50 milliGRAM(s) Oral daily    MEDICATIONS  (PRN):  acetaminophen   Tablet .. 650 milliGRAM(s) Oral every 6 hours PRN Temp greater or equal to 38C (100.4F), Mild Pain (1 - 3)      LABS:                        8.3    7.98  )-----------( 140      ( 2020 17:43 )             26.5     02-14    136  |  92<L>  |  57.0<H>  ----------------------------<  121<H>  5.1   |  29.0  |  1.84<H>    Ca    8.7      2020 08:48    TPro  6.5<L>  /  Alb  3.4  /  TBili  0.3<L>  /  DBili  x   /  AST  11  /  ALT  <5  /  AlkPhos  42  02-13    PT/INR - ( 2020 08:51 )   PT: 48.9 sec;   INR: 4.14 ratio         PTT - ( 2020 17:42 )  PTT:48.9 sec  Urinalysis Basic - ( 2020 22:27 )    Color: Yellow / Appearance: Clear / S.020 / pH: x  Gluc: x / Ketone: Negative  / Bili: Negative / Urobili: Negative   Blood: x / Protein: 15 / Nitrite: Positive   Leuk Esterase: Trace / RBC: 0-2 /HPF / WBC 0-2   Sq Epi: x / Non Sq Epi: Few / Bacteria: Few cc: pna, fever     INTERVAL HPI/ OVERNIGHT EVENTS: pt seen and eval. comfortable. in no acute distress. inr noted to be 8 today. no overt bleeding noted     Vital Signs Last 24 Hrs  T(C): 40.1 (14 Feb 2020 11:59), Max: 40.1 (14 Feb 2020 11:59)  T(F): 104.1 (14 Feb 2020 11:59), Max: 104.1 (14 Feb 2020 11:59)  HR: 70 (14 Feb 2020 11:59) (69 - 81)  BP: 114/42 (14 Feb 2020 11:59) (84/58 - 129/55)  BP(mean): 66 (13 Feb 2020 17:27) (66 - 66)  RR: 20 (14 Feb 2020 11:59) (16 - 22)  SpO2: 93% (14 Feb 2020 09:31) (86% - 96%)    PHYSICAL EXAM-  General: An elderly  female sitting up in bed  not in distress.  HEENT: AT, NC. PERRL. intact EOM. no throat erythema or exudate.   Chest: few scattered basal crackles bilaterally, no wheeze appreciated.  Heart: S1,S2. RRR. + systolic heart murmur. ankle edema left more than right noted.  Abdomen: soft. non-tender. non-distended. + BS.   Ext: no calf tenderness. ROM at left ankle mildly diminished due to pain. edema , warmth and mild erythema noted over dorsal aspect and around ankle, no open wound, rt. ankle / foot without sig. swelling, no pain.   Neuro: AAO x3. no focal weakness. no speech disorder. CN intact.   Skin: no diaphoresis, no cyanosis, other left foot skin findings as in ext. exam.   psych : pleasant, no agitation. mood ok    LABS:                        8.3    7.98  )-----------( 140      ( 13 Feb 2020 17:43 )             26.5     02-14    136  |  92<L>  |  57.0<H>  ----------------------------<  121<H>  5.1   |  29.0  |  1.84<H>    Ca    8.7      14 Feb 2020 08:48    TPro  6.5<L>  /  Alb  3.4  /  TBili  0.3<L>  /  DBili  x   /  AST  11  /  ALT  <5  /  AlkPhos  42  02-13    PT/INR - ( 14 Feb 2020 08:51 )   PT: 48.9 sec;   INR: 4.14 ratio Yes...

## 2025-03-19 ENCOUNTER — APPOINTMENT (OUTPATIENT)
Dept: CARDIOLOGY | Facility: CLINIC | Age: 89
End: 2025-03-19